# Patient Record
Sex: FEMALE | URBAN - METROPOLITAN AREA
[De-identification: names, ages, dates, MRNs, and addresses within clinical notes are randomized per-mention and may not be internally consistent; named-entity substitution may affect disease eponyms.]

---

## 2022-05-27 ENCOUNTER — OUT OF OFFICE VISIT (OUTPATIENT)
Dept: URBAN - METROPOLITAN AREA MEDICAL CENTER 12 | Facility: MEDICAL CENTER | Age: 32
End: 2022-05-27
Payer: COMMERCIAL

## 2022-05-27 DIAGNOSIS — R10.84 ABDOMINAL CRAMPING, GENERALIZED: ICD-10-CM

## 2022-05-27 DIAGNOSIS — R19.7 ACUTE DIARRHEA: ICD-10-CM

## 2022-05-27 DIAGNOSIS — R93.2 ABN FIND-BILIARY TRACT: ICD-10-CM

## 2022-05-27 PROCEDURE — 99204 OFFICE O/P NEW MOD 45 MIN: CPT | Performed by: INTERNAL MEDICINE

## 2022-05-28 ENCOUNTER — OUT OF OFFICE VISIT (OUTPATIENT)
Dept: URBAN - METROPOLITAN AREA MEDICAL CENTER 12 | Facility: MEDICAL CENTER | Age: 32
End: 2022-05-28
Payer: COMMERCIAL

## 2022-05-28 DIAGNOSIS — R19.7 ACUTE DIARRHEA: ICD-10-CM

## 2022-05-28 DIAGNOSIS — R10.84 ABDOMINAL CRAMPING, GENERALIZED: ICD-10-CM

## 2022-05-28 DIAGNOSIS — R93.2 ABN FIND-BILIARY TRACT: ICD-10-CM

## 2022-05-28 PROCEDURE — 99226 SUBSEQUENT OBSERVATION CARE, PER DAY, FOR THE EVALUATION AND MANAGEMENT OF A PATIENT, WHICH REQUIRES AT LEAST 2 OF THESE 3 KEY COMPONENTS: A DETAILE: CPT | Performed by: INTERNAL MEDICINE

## 2023-01-21 ENCOUNTER — OUT OF OFFICE VISIT (OUTPATIENT)
Dept: URBAN - METROPOLITAN AREA MEDICAL CENTER 12 | Facility: MEDICAL CENTER | Age: 33
End: 2023-01-21
Payer: COMMERCIAL

## 2023-01-21 DIAGNOSIS — R74.01 ABNORMAL/ELEVATED TRANSAMINASE (SGOT, AMINOTRANSFERASE): ICD-10-CM

## 2023-01-21 DIAGNOSIS — R74.8 ABNORMAL ALKALINE PHOSPHATASE TEST: ICD-10-CM

## 2023-01-21 DIAGNOSIS — R10.84 ABDOMINAL CRAMPING, GENERALIZED: ICD-10-CM

## 2023-01-21 DIAGNOSIS — K72.00 ACUTE AND SUBACUTE HEPATIC FAILURE WITHOUT COMA: ICD-10-CM

## 2023-01-21 PROCEDURE — G8427 DOCREV CUR MEDS BY ELIG CLIN: HCPCS | Performed by: INTERNAL MEDICINE

## 2023-01-21 PROCEDURE — 99222 1ST HOSP IP/OBS MODERATE 55: CPT | Performed by: INTERNAL MEDICINE

## 2023-01-23 ENCOUNTER — OUT OF OFFICE VISIT (OUTPATIENT)
Dept: URBAN - METROPOLITAN AREA MEDICAL CENTER 12 | Facility: MEDICAL CENTER | Age: 33
End: 2023-01-23
Payer: COMMERCIAL

## 2023-01-23 DIAGNOSIS — R74.8 ABNORMAL ALKALINE PHOSPHATASE TEST: ICD-10-CM

## 2023-01-23 DIAGNOSIS — K72.00 ACUTE AND SUBACUTE HEPATIC FAILURE WITHOUT COMA: ICD-10-CM

## 2023-01-23 DIAGNOSIS — K83.1 AMPULLA OF VATER OBSTRUCTION SYNDROME: ICD-10-CM

## 2023-01-23 DIAGNOSIS — R74.01 ABNORMAL/ELEVATED TRANSAMINASE (SGOT, AMINOTRANSFERASE): ICD-10-CM

## 2023-01-23 PROCEDURE — 99232 SBSQ HOSP IP/OBS MODERATE 35: CPT | Performed by: INTERNAL MEDICINE

## 2023-01-24 ENCOUNTER — OUT OF OFFICE VISIT (OUTPATIENT)
Dept: URBAN - METROPOLITAN AREA MEDICAL CENTER 12 | Facility: MEDICAL CENTER | Age: 33
End: 2023-01-24
Payer: COMMERCIAL

## 2023-01-24 DIAGNOSIS — R74.01 ABNORMAL/ELEVATED TRANSAMINASE (SGOT, AMINOTRANSFERASE): ICD-10-CM

## 2023-01-24 DIAGNOSIS — R79.89 ABNORMAL BILIRUBIN TEST: ICD-10-CM

## 2023-01-24 DIAGNOSIS — K72.00 ACUTE AND SUBACUTE HEPATIC FAILURE WITHOUT COMA: ICD-10-CM

## 2023-01-24 DIAGNOSIS — R74.8 ABNORMAL ALKALINE PHOSPHATASE TEST: ICD-10-CM

## 2023-01-24 PROCEDURE — 99233 SBSQ HOSP IP/OBS HIGH 50: CPT

## 2023-01-25 ENCOUNTER — OUT OF OFFICE VISIT (OUTPATIENT)
Dept: URBAN - METROPOLITAN AREA MEDICAL CENTER 12 | Facility: MEDICAL CENTER | Age: 33
End: 2023-01-25
Payer: COMMERCIAL

## 2023-01-25 DIAGNOSIS — R10.84 ABDOMINAL CRAMPING, GENERALIZED: ICD-10-CM

## 2023-01-25 DIAGNOSIS — M32.9 LUPUS: ICD-10-CM

## 2023-01-25 DIAGNOSIS — R93.2 ABN FIND-BILIARY TRACT: ICD-10-CM

## 2023-01-25 DIAGNOSIS — K72.00 ACUTE AND SUBACUTE HEPATIC FAILURE WITHOUT COMA: ICD-10-CM

## 2023-01-25 PROCEDURE — 99233 SBSQ HOSP IP/OBS HIGH 50: CPT

## 2023-02-03 ENCOUNTER — OFFICE VISIT (OUTPATIENT)
Dept: URBAN - METROPOLITAN AREA CLINIC 86 | Facility: CLINIC | Age: 33
End: 2023-02-03
Payer: COMMERCIAL

## 2023-02-03 ENCOUNTER — WEB ENCOUNTER (OUTPATIENT)
Dept: URBAN - METROPOLITAN AREA CLINIC 86 | Facility: CLINIC | Age: 33
End: 2023-02-03

## 2023-02-03 VITALS
SYSTOLIC BLOOD PRESSURE: 105 MMHG | HEIGHT: 61 IN | HEART RATE: 83 BPM | TEMPERATURE: 97 F | WEIGHT: 136 LBS | DIASTOLIC BLOOD PRESSURE: 70 MMHG | BODY MASS INDEX: 25.68 KG/M2

## 2023-02-03 DIAGNOSIS — Z98.890 HISTORY OF LIVER BIOPSY: ICD-10-CM

## 2023-02-03 DIAGNOSIS — K74.60 HEPATIC CIRRHOSIS, UNSPECIFIED HEPATIC CIRRHOSIS TYPE, UNSPECIFIED WHETHER ASCITES PRESENT: ICD-10-CM

## 2023-02-03 DIAGNOSIS — D68.61 ANTIPHOSPHOLIPID ANTIBODY SYNDROME: ICD-10-CM

## 2023-02-03 DIAGNOSIS — I85.00 ESOPHAGEAL VARICES WITHOUT BLEEDING, UNSPECIFIED ESOPHAGEAL VARICES TYPE: ICD-10-CM

## 2023-02-03 DIAGNOSIS — R74.8 ELEVATED LIVER ENZYMES: ICD-10-CM

## 2023-02-03 DIAGNOSIS — I81 PVT (PORTAL VEIN THROMBOSIS): ICD-10-CM

## 2023-02-03 DIAGNOSIS — D69.3 CHRONIC ITP (IDIOPATHIC THROMBOCYTOPENIA): ICD-10-CM

## 2023-02-03 PROCEDURE — 99204 OFFICE O/P NEW MOD 45 MIN: CPT | Performed by: PHYSICIAN ASSISTANT

## 2023-02-03 RX ORDER — CHOLECALCIFEROL (VITAMIN D3) 50 MCG
1 TABLET TABLET ORAL ONCE A DAY
Status: ACTIVE | COMMUNITY

## 2023-02-03 RX ORDER — HYDROXYCHLOROQUINE SULFATE 200 MG/1
AS DIRECTED TABLET, FILM COATED ORAL
Status: ACTIVE | COMMUNITY

## 2023-02-03 RX ORDER — FOLIC ACID 1 MG/1
1 TABLET TABLET ORAL ONCE A DAY
Status: ACTIVE | COMMUNITY

## 2023-02-06 ENCOUNTER — LAB OUTSIDE AN ENCOUNTER (OUTPATIENT)
Dept: URBAN - METROPOLITAN AREA CLINIC 86 | Facility: CLINIC | Age: 33
End: 2023-02-06

## 2023-02-07 ENCOUNTER — TELEPHONE ENCOUNTER (OUTPATIENT)
Dept: URBAN - METROPOLITAN AREA CLINIC 86 | Facility: CLINIC | Age: 33
End: 2023-02-07

## 2023-02-07 LAB
ALBUMIN/GLOBULIN RATIO: 1
ALBUMIN: 4.1
ALKALINE PHOSPHATASE: 736
ALT (SGPT): 100
AST (SGOT): 162
BILIRUBIN, DIRECT: 0.2
BILIRUBIN, INDIRECT: 0.5
BILIRUBIN, TOTAL: 0.7
GLOBULIN: 4.1
PROTEIN, TOTAL: 8.2

## 2023-02-08 ENCOUNTER — TELEPHONE ENCOUNTER (OUTPATIENT)
Dept: URBAN - METROPOLITAN AREA CLINIC 92 | Facility: CLINIC | Age: 33
End: 2023-02-08

## 2023-02-08 ENCOUNTER — TELEPHONE ENCOUNTER (OUTPATIENT)
Dept: URBAN - METROPOLITAN AREA CLINIC 86 | Facility: CLINIC | Age: 33
End: 2023-02-08

## 2023-02-09 ENCOUNTER — LAB OUTSIDE AN ENCOUNTER (OUTPATIENT)
Dept: URBAN - METROPOLITAN AREA CLINIC 86 | Facility: CLINIC | Age: 33
End: 2023-02-09

## 2023-02-09 ENCOUNTER — LAB OUTSIDE AN ENCOUNTER (OUTPATIENT)
Dept: URBAN - METROPOLITAN AREA CLINIC 92 | Facility: CLINIC | Age: 33
End: 2023-02-09

## 2023-02-09 ENCOUNTER — TELEPHONE ENCOUNTER (OUTPATIENT)
Dept: URBAN - METROPOLITAN AREA CLINIC 86 | Facility: CLINIC | Age: 33
End: 2023-02-09

## 2023-02-09 ENCOUNTER — WEB ENCOUNTER (OUTPATIENT)
Dept: URBAN - METROPOLITAN AREA CLINIC 86 | Facility: CLINIC | Age: 33
End: 2023-02-09

## 2023-02-09 LAB
ALBUMIN/GLOBULIN RATIO: 1.1
ALBUMIN: 4
ALKALINE PHOSPHATASE: 612
ALT (SGPT): 120
AST (SGOT): 154
BILIRUBIN, DIRECT: 0.2
BILIRUBIN, INDIRECT: 0.5
BILIRUBIN, TOTAL: 0.7
GLOBULIN: 3.6
PROTEIN, TOTAL: 7.6

## 2023-02-10 ENCOUNTER — WEB ENCOUNTER (OUTPATIENT)
Dept: URBAN - METROPOLITAN AREA CLINIC 86 | Facility: CLINIC | Age: 33
End: 2023-02-10

## 2023-02-10 ENCOUNTER — TELEPHONE ENCOUNTER (OUTPATIENT)
Dept: URBAN - METROPOLITAN AREA CLINIC 86 | Facility: CLINIC | Age: 33
End: 2023-02-10

## 2023-02-10 LAB
ALBUMIN/GLOBULIN RATIO: 1
ALBUMIN: 3.9
ALKALINE PHOSPHATASE: 652
ALT (SGPT): 133
AST (SGOT): 180
BILIRUBIN, DIRECT: 0.2
BILIRUBIN, INDIRECT: 0.4
BILIRUBIN, TOTAL: 0.6
GLOBULIN: 3.8
PROTEIN, TOTAL: 7.7

## 2023-02-13 ENCOUNTER — WEB ENCOUNTER (OUTPATIENT)
Dept: URBAN - METROPOLITAN AREA CLINIC 86 | Facility: CLINIC | Age: 33
End: 2023-02-13

## 2023-02-13 ENCOUNTER — LAB OUTSIDE AN ENCOUNTER (OUTPATIENT)
Dept: URBAN - METROPOLITAN AREA CLINIC 86 | Facility: CLINIC | Age: 33
End: 2023-02-13

## 2023-02-14 ENCOUNTER — OFFICE VISIT (OUTPATIENT)
Dept: URBAN - METROPOLITAN AREA CLINIC 86 | Facility: CLINIC | Age: 33
End: 2023-02-14
Payer: COMMERCIAL

## 2023-02-14 ENCOUNTER — LAB OUTSIDE AN ENCOUNTER (OUTPATIENT)
Dept: URBAN - METROPOLITAN AREA CLINIC 86 | Facility: CLINIC | Age: 33
End: 2023-02-14

## 2023-02-14 VITALS
BODY MASS INDEX: 25.86 KG/M2 | HEART RATE: 91 BPM | WEIGHT: 137 LBS | TEMPERATURE: 97.9 F | DIASTOLIC BLOOD PRESSURE: 74 MMHG | SYSTOLIC BLOOD PRESSURE: 110 MMHG | HEIGHT: 61 IN

## 2023-02-14 DIAGNOSIS — I85.00 ESOPHAGEAL VARICES WITHOUT BLEEDING, UNSPECIFIED ESOPHAGEAL VARICES TYPE: ICD-10-CM

## 2023-02-14 DIAGNOSIS — Z98.890 HISTORY OF LIVER BIOPSY: ICD-10-CM

## 2023-02-14 DIAGNOSIS — I81 PVT (PORTAL VEIN THROMBOSIS): ICD-10-CM

## 2023-02-14 DIAGNOSIS — D68.61 ANTIPHOSPHOLIPID ANTIBODY SYNDROME: ICD-10-CM

## 2023-02-14 DIAGNOSIS — D69.3 CHRONIC ITP (IDIOPATHIC THROMBOCYTOPENIA): ICD-10-CM

## 2023-02-14 DIAGNOSIS — R74.8 ELEVATED LIVER ENZYMES: ICD-10-CM

## 2023-02-14 DIAGNOSIS — K74.60 HEPATIC CIRRHOSIS, UNSPECIFIED HEPATIC CIRRHOSIS TYPE, UNSPECIFIED WHETHER ASCITES PRESENT: ICD-10-CM

## 2023-02-14 LAB
ALBUMIN/GLOBULIN RATIO: 1
ALBUMIN: 4
ALKALINE PHOSPHATASE: 578
ALT (SGPT): 117
AST (SGOT): 152
BILIRUBIN, DIRECT: 0.2
BILIRUBIN, INDIRECT: 0.5
BILIRUBIN, TOTAL: 0.7
GLOBULIN: 3.9
PROTEIN, TOTAL: 7.9

## 2023-02-14 PROCEDURE — 99214 OFFICE O/P EST MOD 30 MIN: CPT | Performed by: PHYSICIAN ASSISTANT

## 2023-02-14 RX ORDER — HYDROXYCHLOROQUINE SULFATE 200 MG/1
AS DIRECTED TABLET, FILM COATED ORAL
Status: ACTIVE | COMMUNITY

## 2023-02-14 RX ORDER — CHOLECALCIFEROL (VITAMIN D3) 50 MCG
1 TABLET TABLET ORAL ONCE A DAY
Status: ACTIVE | COMMUNITY

## 2023-02-14 RX ORDER — FOLIC ACID 1 MG/1
1 TABLET TABLET ORAL ONCE A DAY
Status: ACTIVE | COMMUNITY

## 2023-02-14 NOTE — HPI-TODAY'S VISIT:
This is a 32 year old female who was recently seen in the hospital by Dr. Shona Fuller who presetns for evaluation of elevated liver enzymes.   2/14/23 visit  2/13/23 labs with alp 578, ast 152, alt  117 2/9/23 652  .  2/7/23 alp 612, [id number], [id number] 2/6/23 alp 736, ast 162, alt 100.    Asked about diet and having more solid foods with grilled, baked foods and juicing. She is juicing a lot of fruits and vegetables with the benefiber. B12 folic, plaquenil, eliquis . She is drinkign a lot of beet juice and beets and mixings in the fruits and apples and grapes. Mixes in the carrots and leafy greens. she is not doing any protein powders. She is also doing sea strauss and she will stop this, was not taking at last visit. She started this and the juicing with beets after the last visit.   recap 2/1/23 Patient with past medical history including history of liver biopsy, chronic portal vein thrombosis, antiphospholipid syndrome, chronic idiopathic thrombocytopenia.  She is listed taking apixaban 5 mg twice daily, vitamin D, folic acid 1 mg, hydroxychloroquine alone 20 mg, pantoprazole 40 mg, presented to the hospital with right upper quadrant pain and elevated liver function test.  CT showing the chronic portal vein occlusion with cavernous formation and changes of chronic liver disease.  While in the hospital acute hepatitis panel negative and they suspected the elevation in her labs is due to the various supplements she was taking including dandelion root, tumeric, ashwaganda and this was stopped. She had a cholecystectomy while in the hospital.   1/28/23 labs cr 0.59, sodium 138, potassium 3.6, alp 359, alt 69, ast88, bilirubin 0.5. wbc 3.4, hemoglobin 9.0, mcv 84,  Aug 2022 labs alp 213, ast 53, alt 50 MRI 7/2022 liver w/changes of chronic liver disase and stable chronic thrombosis of main and right pv with cavernous transformation. thrombosis of splenic vein. Esophageal, perisplenic, and perigastric varices.  Last egd in June and banding was done  EGD done in june and 1 grade 1 varice was banded and will repeat in 1 year. Denies any issues with swelling.  Saw the surgeon yesterday, drain removed and surgical sites healing well. Still some pain and fatigue but slowly getting better. She is still off of the supplements. She had labs done yesterday and 2/3/23 alp 640, ast 151, alt 88 cr 0.74 and asked about what changed and found to be taking motrin and suspect this is the cause and she has not changed anything else. DIscussed NSAIDS and cautioned on this Discussed we will montior the labs and cautioned on other supplements.

## 2023-02-15 ENCOUNTER — LAB OUTSIDE AN ENCOUNTER (OUTPATIENT)
Dept: URBAN - METROPOLITAN AREA CLINIC 86 | Facility: CLINIC | Age: 33
End: 2023-02-15

## 2023-02-20 ENCOUNTER — LAB OUTSIDE AN ENCOUNTER (OUTPATIENT)
Dept: URBAN - METROPOLITAN AREA CLINIC 86 | Facility: CLINIC | Age: 33
End: 2023-02-20

## 2023-02-22 ENCOUNTER — WEB ENCOUNTER (OUTPATIENT)
Dept: URBAN - METROPOLITAN AREA CLINIC 86 | Facility: CLINIC | Age: 33
End: 2023-02-22

## 2023-02-23 ENCOUNTER — TELEPHONE ENCOUNTER (OUTPATIENT)
Dept: URBAN - METROPOLITAN AREA CLINIC 86 | Facility: CLINIC | Age: 33
End: 2023-02-23

## 2023-02-23 ENCOUNTER — WEB ENCOUNTER (OUTPATIENT)
Dept: URBAN - METROPOLITAN AREA CLINIC 86 | Facility: CLINIC | Age: 33
End: 2023-02-23

## 2023-02-23 PROBLEM — 200936003 LUPUS: Status: ACTIVE | Noted: 2023-02-23

## 2023-02-23 LAB
ALBUMIN/GLOBULIN RATIO: 0.9
ALBUMIN: 4
ALKALINE PHOSPHATASE: 392
ALT (SGPT): 212
AST (SGOT): 178
BILIRUBIN, DIRECT: 0.2
BILIRUBIN, INDIRECT: 0.8
BILIRUBIN, TOTAL: 1
GLOBULIN: 4.3
IMMUNOGLOBULIN A: 347
IMMUNOGLOBULIN G: 2438
IMMUNOGLOBULIN M: 81
PROTEIN, TOTAL: 8.3

## 2023-02-27 ENCOUNTER — LAB OUTSIDE AN ENCOUNTER (OUTPATIENT)
Dept: URBAN - METROPOLITAN AREA CLINIC 86 | Facility: CLINIC | Age: 33
End: 2023-02-27

## 2023-02-27 ENCOUNTER — WEB ENCOUNTER (OUTPATIENT)
Dept: URBAN - METROPOLITAN AREA CLINIC 86 | Facility: CLINIC | Age: 33
End: 2023-02-27

## 2023-02-27 ENCOUNTER — TELEPHONE ENCOUNTER (OUTPATIENT)
Dept: URBAN - METROPOLITAN AREA CLINIC 86 | Facility: CLINIC | Age: 33
End: 2023-02-27

## 2023-02-27 PROBLEM — 301717006 RIGHT UPPER QUADRANT PAIN: Status: ACTIVE | Noted: 2023-02-27

## 2023-03-01 ENCOUNTER — OFFICE VISIT (OUTPATIENT)
Dept: URBAN - METROPOLITAN AREA CLINIC 86 | Facility: CLINIC | Age: 33
End: 2023-03-01

## 2023-03-01 ENCOUNTER — OFFICE VISIT (OUTPATIENT)
Dept: URBAN - METROPOLITAN AREA TELEHEALTH 2 | Facility: TELEHEALTH | Age: 33
End: 2023-03-01
Payer: COMMERCIAL

## 2023-03-01 VITALS — HEIGHT: 61 IN | BODY MASS INDEX: 24.55 KG/M2 | WEIGHT: 130 LBS

## 2023-03-01 DIAGNOSIS — K71.9 DRUG-INDUCED LIVER INJURY: ICD-10-CM

## 2023-03-01 DIAGNOSIS — K74.69 CIRRHOSIS, CRYPTOGENIC: ICD-10-CM

## 2023-03-01 DIAGNOSIS — I85.10 ESOPH VARICE OTHER DIS: ICD-10-CM

## 2023-03-01 DIAGNOSIS — I81 PVT (PORTAL VEIN THROMBOSIS): ICD-10-CM

## 2023-03-01 PROBLEM — 453861000124107: Status: ACTIVE | Noted: 2023-03-01

## 2023-03-01 PROBLEM — 416940007: Status: ACTIVE | Noted: 2023-03-01

## 2023-03-01 PROBLEM — 26843008: Status: ACTIVE | Noted: 2023-02-01

## 2023-03-01 PROBLEM — 13172003: Status: ACTIVE | Noted: 2023-02-01

## 2023-03-01 PROBLEM — 19943007: Status: ACTIVE | Noted: 2023-02-01

## 2023-03-01 PROBLEM — 17920008 PORTAL VEIN THROMBOSIS: Status: ACTIVE | Noted: 2023-02-07

## 2023-03-01 PROBLEM — 427399008: Status: ACTIVE | Noted: 2023-03-01

## 2023-03-01 PROBLEM — 707724006 ELEVATED LIVER ENZYMES LEVEL: Status: ACTIVE | Noted: 2023-02-07

## 2023-03-01 PROBLEM — 14223005: Status: ACTIVE | Noted: 2023-02-01

## 2023-03-01 PROCEDURE — 99214 OFFICE O/P EST MOD 30 MIN: CPT

## 2023-03-01 RX ORDER — HYDROXYCHLOROQUINE SULFATE 200 MG/1
AS DIRECTED TABLET, FILM COATED ORAL
Status: ACTIVE | COMMUNITY

## 2023-03-01 RX ORDER — CHOLECALCIFEROL (VITAMIN D3) 50 MCG
1 TABLET TABLET ORAL ONCE A DAY
Status: ACTIVE | COMMUNITY

## 2023-03-01 RX ORDER — FOLIC ACID 1 MG/1
1 TABLET TABLET ORAL ONCE A DAY
Status: ACTIVE | COMMUNITY

## 2023-03-01 NOTE — HPI-TODAY'S VISIT:
This is a 32 year old female who was recently seen in the hospital by Dr. Shona Fuller who presetns for evaluation of elevated liver enzymes.    3/1/23 jacob glez alk phos 392, ast 178, alt 212 2/14/23 visit  2/13/23 labs with alp 578, ast 152, alt  117 2/9/23 652  .  2/7/23 alp 612, [id number], [id number] 2/6/23 alp 736, ast 162, alt 100.    Asked about diet and having more solid foods with grilled, baked foods and juicing. She is juicing a lot of fruits and vegetables with the benefiber. B12 folic, plaquenil, eliquis . She is drinkign a lot of beet juice and beets and mixings in the fruits and apples and grapes. Mixes in the carrots and leafy greens. she is not doing any protein powders. She is also doing sea strauss and she will stop this, was not taking at last visit. She started this and the juicing with beets after the last visit.   recap 2/1/23 Patient with past medical history including history of liver biopsy, chronic portal vein thrombosis, antiphospholipid syndrome, chronic idiopathic thrombocytopenia.  She is listed taking apixaban 5 mg twice daily, vitamin D, folic acid 1 mg, hydroxychloroquine alone 20 mg, pantoprazole 40 mg, presented to the hospital with right upper quadrant pain and elevated liver function test.  CT showing the chronic portal vein occlusion with cavernous formation and changes of chronic liver disease.  While in the hospital acute hepatitis panel negative and they suspected the elevation in her labs is due to the various supplements she was taking including dandelion root, tumeric, ashwaganda and this was stopped. She had a cholecystectomy while in the hospital.   1/28/23 labs cr 0.59, sodium 138, potassium 3.6, alp 359, alt 69, ast88, bilirubin 0.5. wbc 3.4, hemoglobin 9.0, mcv 84,  Aug 2022 labs alp 213, ast 53, alt 50 MRI 7/2022 liver w/changes of chronic liver disase and stable chronic thrombosis of main and right pv with cavernous transformation. thrombosis of splenic vein. Esophageal, perisplenic, and perigastric varices.  Last egd in June and banding was done  EGD done in june and 1 grade 1 varice was banded and will repeat in 1 year. Denies any issues with swelling.  Saw the surgeon yesterday, drain removed and surgical sites healing well. Still some pain and fatigue but slowly getting better. She is still off of the supplements. She had labs done yesterday and 2/3/23 alp 640, ast 151, alt 88 cr 0.74 and asked about what changed and found to be taking motrin and suspect this is the cause and she has not changed anything else. DIscussed NSAIDS and cautioned on this Discussed we will montior the labs and cautioned on other supplements.

## 2023-03-01 NOTE — HPI-TODAY'S VISIT:
This is a 32 year old female who was recently seen in the hospital by Dr. Shona Fuller who presetns for evaluation of elevated liver enzymes.   3/1/23 2/23/23 alk phos 392, ast 178, alt 212 igg 2438 bili 1.0 recap 2/13/23 labs with alp 578, ast 152, alt  117 2/9/23 652  .  2/7/23 alp 612, [id number], [id number] 2/6/23 alp 736, ast 162, alt 100.    Asked about diet and having more solid foods with grilled, baked foods and juicing. She is juicing a lot of fruits and vegetables with the benefiber. B12 folic, plaquenil, eliquis . She is drinkign a lot of beet juice and beets and mixings in the fruits and apples and grapes. Mixes in the carrots and leafy greens. she is not doing any protein powders. She is also doing sea strauss and she will stop this, was not taking at last visit. She started this and the juicing with beets after the last visit.   recap 2/1/23 Patient with past medical history including history of liver biopsy, chronic portal vein thrombosis, antiphospholipid syndrome, chronic idiopathic thrombocytopenia.  She is listed taking apixaban 5 mg twice daily, vitamin D, folic acid 1 mg, hydroxychloroquine alone 20 mg, pantoprazole 40 mg, presented to the hospital with right upper quadrant pain and elevated liver function test.  CT showing the chronic portal vein occlusion with cavernous formation and changes of chronic liver disease.  While in the hospital acute hepatitis panel negative and they suspected the elevation in her labs is due to the various supplements she was taking including dandelion root, tumeric, ashwaganda and this was stopped. She had a cholecystectomy while in the hospital.   1/28/23 labs cr 0.59, sodium 138, potassium 3.6, alp 359, alt 69, ast88, bilirubin 0.5. wbc 3.4, hemoglobin 9.0, mcv 84,  Aug 2022 labs alp 213, ast 53, alt 50 MRI 7/2022 liver w/changes of chronic liver disase and stable chronic thrombosis of main and right pv with cavernous transformation. thrombosis of splenic vein. Esophageal, perisplenic, and perigastric varices.  Last egd in June and banding was done  EGD done in june and 1 grade 1 varice was banded and will repeat in 1 year. Denies any issues with swelling.  Saw the surgeon yesterday, drain removed and surgical sites healing well. Still some pain and fatigue but slowly getting better. She is still off of the supplements. She had labs done yesterday and 2/3/23 alp 640, ast 151, alt 88 cr 0.74 and asked about what changed and found to be taking motrin and suspect this is the cause and she has not changed anything else. DIscussed NSAIDS and cautioned on this Discussed we will montior the labs and cautioned on other supplements.

## 2023-03-02 ENCOUNTER — LAB OUTSIDE AN ENCOUNTER (OUTPATIENT)
Dept: URBAN - METROPOLITAN AREA CLINIC 86 | Facility: CLINIC | Age: 33
End: 2023-03-02

## 2023-03-04 LAB
ALBUMIN/GLOBULIN RATIO: 0.9
ALBUMIN: 3.6
ALKALINE PHOSPHATASE: 268
ALT (SGPT): 39
AST (SGOT): 41
BILIRUBIN, DIRECT: 0.1
BILIRUBIN, INDIRECT: 0.3
BILIRUBIN, TOTAL: 0.4
GLOBULIN: 3.9
PROTEIN, TOTAL: 7.5

## 2023-03-06 ENCOUNTER — TELEPHONE ENCOUNTER (OUTPATIENT)
Dept: URBAN - METROPOLITAN AREA CLINIC 86 | Facility: CLINIC | Age: 33
End: 2023-03-06

## 2023-03-06 ENCOUNTER — WEB ENCOUNTER (OUTPATIENT)
Dept: URBAN - METROPOLITAN AREA CLINIC 86 | Facility: CLINIC | Age: 33
End: 2023-03-06

## 2023-03-06 LAB
IMMUNOGLOBULIN A: 356
IMMUNOGLOBULIN G: 2430
IMMUNOGLOBULIN M: 81

## 2023-03-08 ENCOUNTER — OFFICE VISIT (OUTPATIENT)
Dept: URBAN - METROPOLITAN AREA CLINIC 92 | Facility: CLINIC | Age: 33
End: 2023-03-08
Payer: COMMERCIAL

## 2023-03-08 ENCOUNTER — TELEPHONE ENCOUNTER (OUTPATIENT)
Dept: URBAN - METROPOLITAN AREA CLINIC 92 | Facility: CLINIC | Age: 33
End: 2023-03-08

## 2023-03-08 VITALS
TEMPERATURE: 97 F | HEART RATE: 91 BPM | HEIGHT: 61 IN | DIASTOLIC BLOOD PRESSURE: 72 MMHG | WEIGHT: 138 LBS | SYSTOLIC BLOOD PRESSURE: 104 MMHG | BODY MASS INDEX: 26.06 KG/M2

## 2023-03-08 DIAGNOSIS — I85.00 VARICES, ESOPHAGEAL: ICD-10-CM

## 2023-03-08 DIAGNOSIS — K59.09 CHRONIC CONSTIPATION: ICD-10-CM

## 2023-03-08 PROBLEM — 14223005 ESOPHAGEAL VARICES WITHOUT BLEEDING: Status: ACTIVE | Noted: 2023-03-08

## 2023-03-08 PROCEDURE — 99215 OFFICE O/P EST HI 40 MIN: CPT | Performed by: INTERNAL MEDICINE

## 2023-03-08 RX ORDER — HYDROXYCHLOROQUINE SULFATE 200 MG/1
AS DIRECTED TABLET, FILM COATED ORAL
Status: ACTIVE | COMMUNITY

## 2023-03-08 RX ORDER — CHOLECALCIFEROL (VITAMIN D3) 50 MCG
1 TABLET TABLET ORAL ONCE A DAY
Status: ACTIVE | COMMUNITY

## 2023-03-08 RX ORDER — FOLIC ACID 1 MG/1
1 TABLET TABLET ORAL ONCE A DAY
Status: ACTIVE | COMMUNITY

## 2023-03-08 NOTE — HPI-TODAY'S VISIT:
32yF with a hx of PVT c/b cirrhosis, on Eliquis, SLE, ITP, recurrent PEs, recent CCY one month ago, who presents to discuss abdominal discomfort/bloating. More constipation than diarrhea, can go up to 5 days without a BM, worsening in the pats 2 years. Constipation followed by diarrhea. Since CCY has been having a daily BM, but feels incomplete. Sometimes pebble-like, sometimes loose. Has bloating/gas, abdominal discomfort. No blood in the stool.  Has had esophageal varices s/p banding at [place], and she follows over there to get EGDs annually.

## 2023-03-09 LAB
ABSOLUTE BASOPHILS: 0
ABSOLUTE EOSINOPHILS: 0
ABSOLUTE LYMPHOCYTES: 1288
ABSOLUTE MONOCYTES: 368
ABSOLUTE NEUTROPHILS: 2944
BASOPHILS: 0
COMMENT(S): (no result)
EOSINOPHILS: 0
HEMATOCRIT: 27.5
HEMOGLOBIN: 8.8
LYMPHOCYTES: 28
MCH: 25.1
MCHC: 32
MCV: 78.3
MONOCYTES: 8
MPV: 11.6
NEUTROPHILS: 64
NOTE: (no result)
PLATELET COUNT: 114
RDW: 14.8
RED BLOOD CELL COUNT: 3.51
TSH: 2.93
WHITE BLOOD CELL COUNT: 4.6

## 2023-03-15 ENCOUNTER — LAB OUTSIDE AN ENCOUNTER (OUTPATIENT)
Dept: URBAN - METROPOLITAN AREA TELEHEALTH 2 | Facility: TELEHEALTH | Age: 33
End: 2023-03-15

## 2023-03-29 ENCOUNTER — LAB OUTSIDE AN ENCOUNTER (OUTPATIENT)
Dept: URBAN - METROPOLITAN AREA TELEHEALTH 2 | Facility: TELEHEALTH | Age: 33
End: 2023-03-29

## 2023-04-10 ENCOUNTER — LAB OUTSIDE AN ENCOUNTER (OUTPATIENT)
Dept: URBAN - METROPOLITAN AREA TELEHEALTH 2 | Facility: TELEHEALTH | Age: 33
End: 2023-04-10

## 2023-04-12 ENCOUNTER — OFFICE VISIT (OUTPATIENT)
Dept: URBAN - METROPOLITAN AREA TELEHEALTH 2 | Facility: TELEHEALTH | Age: 33
End: 2023-04-12

## 2023-04-14 ENCOUNTER — WEB ENCOUNTER (OUTPATIENT)
Dept: URBAN - METROPOLITAN AREA CLINIC 86 | Facility: CLINIC | Age: 33
End: 2023-04-14

## 2023-04-14 ENCOUNTER — TELEPHONE ENCOUNTER (OUTPATIENT)
Dept: URBAN - METROPOLITAN AREA CLINIC 86 | Facility: CLINIC | Age: 33
End: 2023-04-14

## 2023-04-14 ENCOUNTER — OFFICE VISIT (OUTPATIENT)
Dept: URBAN - METROPOLITAN AREA CLINIC 86 | Facility: CLINIC | Age: 33
End: 2023-04-14

## 2023-04-14 RX ORDER — HYDROXYCHLOROQUINE SULFATE 200 MG/1
AS DIRECTED TABLET, FILM COATED ORAL
Status: ACTIVE | COMMUNITY

## 2023-04-14 RX ORDER — CHOLECALCIFEROL (VITAMIN D3) 50 MCG
1 TABLET TABLET ORAL ONCE A DAY
Status: ACTIVE | COMMUNITY

## 2023-04-14 RX ORDER — FOLIC ACID 1 MG/1
1 TABLET TABLET ORAL ONCE A DAY
Status: ACTIVE | COMMUNITY

## 2023-04-14 NOTE — HPI-TODAY'S VISIT:
This is a 32 year old female who was recently seen in the hospital by Dr. Shona Fuller who presetns for evaluation of elevated liver enzymes.   3/1/23 2/23/23 alk phos 392, ast 178, alt 212 igg 2438 bili 1.0 recap 2/13/23 labs with alp 578, ast 152, alt  117 2/9/23 652  .  2/7/23 alp 612, [id number], [id number] 2/6/23 alp 736, ast 162, alt 100.    Asked about diet and having more solid foods with grilled, baked foods and juicing. She is juicing a lot of fruits and vegetables with the benefiber. B12 folic, plaquenil, eliquis . She is drinkign a lot of beet juice and beets and mixings in the fruits and apples and grapes. Mixes in the carrots and leafy greens. she is not doing any protein powders. She is also doing sea strauss and she will stop this, was not taking at last visit. She started this and the juicing with beets after the last visit.   recap 2/1/23 Patient with past medical history including history of liver biopsy, chronic portal vein thrombosis, antiphospholipid syndrome, chronic idiopathic thrombocytopenia.  She is listed taking apixaban 5 mg twice daily, vitamin D, folic acid 1 mg, hydroxychloroquine alone 20 mg, pantoprazole 40 mg, presented to the hospital with right upper quadrant pain and elevated liver function test.  CT showing the chronic portal vein occlusion with cavernous formation and changes of chronic liver disease.  While in the hospital acute hepatitis panel negative and they suspected the elevation in her labs is due to the various supplements she was taking including dandelion root, tumeric, ashwaganda and this was stopped. She had a cholecystectomy while in the hospital.   1/28/23 labs cr 0.59, sodium 138, potassium 3.6, alp 359, alt 69, ast88, bilirubin 0.5. wbc 3.4, hemoglobin 9.0, mcv 84,  Aug 2022 labs alp 213, ast 53, alt 50 MRI 7/2022 liver w/changes of chronic liver disase and stable chronic thrombosis of main and right pv with cavernous transformation. thrombosis of splenic vein. Esophageal, perisplenic, and perigastric varices.  Last egd in June and banding was done  EGD done in june and 1 grade 1 varice was banded and will repeat in 1 year. Denies any issues with swelling.  Saw the surgeon yesterday, drain removed and surgical sites healing well. Still some pain and fatigue but slowly getting better. She is still off of the supplements. She had labs done yesterday and 2/3/23 alp 640, ast 151, alt 88 cr 0.74 and asked about what changed and found to be taking motrin and suspect this is the cause and she has not changed anything else. DIscussed NSAIDS and cautioned on this Discussed we will montior the labs and cautioned on other supplements.

## 2023-04-21 ENCOUNTER — OFFICE VISIT (OUTPATIENT)
Dept: URBAN - METROPOLITAN AREA CLINIC 86 | Facility: CLINIC | Age: 33
End: 2023-04-21
Payer: COMMERCIAL

## 2023-04-21 VITALS
WEIGHT: 137 LBS | BODY MASS INDEX: 25.86 KG/M2 | HEIGHT: 61 IN | HEART RATE: 98 BPM | DIASTOLIC BLOOD PRESSURE: 72 MMHG | TEMPERATURE: 97.4 F | SYSTOLIC BLOOD PRESSURE: 110 MMHG

## 2023-04-21 DIAGNOSIS — I81 PVT (PORTAL VEIN THROMBOSIS): ICD-10-CM

## 2023-04-21 DIAGNOSIS — D69.3 CHRONIC ITP (IDIOPATHIC THROMBOCYTOPENIA): ICD-10-CM

## 2023-04-21 DIAGNOSIS — K71.9 DRUG-INDUCED LIVER INJURY: ICD-10-CM

## 2023-04-21 DIAGNOSIS — R74.8 ELEVATED LIVER ENZYMES: ICD-10-CM

## 2023-04-21 DIAGNOSIS — Z98.890 HISTORY OF LIVER BIOPSY: ICD-10-CM

## 2023-04-21 DIAGNOSIS — I85.00 ESOPHAGEAL VARICES WITHOUT BLEEDING, UNSPECIFIED ESOPHAGEAL VARICES TYPE: ICD-10-CM

## 2023-04-21 DIAGNOSIS — Z79.899 HIGH RISK MEDICATION USE: ICD-10-CM

## 2023-04-21 DIAGNOSIS — D68.61 ANTIPHOSPHOLIPID ANTIBODY SYNDROME: ICD-10-CM

## 2023-04-21 DIAGNOSIS — K74.60 HEPATIC CIRRHOSIS, UNSPECIFIED HEPATIC CIRRHOSIS TYPE, UNSPECIFIED WHETHER ASCITES PRESENT: ICD-10-CM

## 2023-04-21 PROCEDURE — 99214 OFFICE O/P EST MOD 30 MIN: CPT | Performed by: PHYSICIAN ASSISTANT

## 2023-04-21 RX ORDER — FOLIC ACID 1 MG/1
1 TABLET TABLET ORAL ONCE A DAY
Status: ACTIVE | COMMUNITY

## 2023-04-21 RX ORDER — CHOLECALCIFEROL (VITAMIN D3) 50 MCG
1 TABLET TABLET ORAL ONCE A DAY
Status: ACTIVE | COMMUNITY

## 2023-04-21 RX ORDER — HYDROXYCHLOROQUINE SULFATE 200 MG/1
AS DIRECTED TABLET, FILM COATED ORAL
Status: ACTIVE | COMMUNITY

## 2023-04-21 NOTE — HPI-TODAY'S VISIT:
This is a 32 year old female who was recently seen in the hospital by Dr. Shona Fuller who presetns for evaluation of elevated liver enzymes.   4/21/23 Pt no showed appt last week. several attempts were made to contact and no answer.  She saw the hematologist. her platlets are low and going on steriods.  They are discussing rixutan  4/20/23 labs with alp 107, ast 37, 20. cr 0.63,  cbc with white blood cells 2.7, 3.54, hemoglobin 8.8, 39, mcv 81 happy to see the enzymes are better but concerned with platelets and they will be monitoring this check the immunoglobulins next week she is working on reducing the sugar in diet and red meat She will need updated imaging on the liver and clot  she is due for egd in june with dr. mckay   3/1/23 2/23/23 alk phos 392, ast 178, alt 212 igg 2438 bili 1.0 recap 2/13/23 labs with alp 578, ast 152, alt  117 2/9/23 652  .  2/7/23 alp 612, [id number], [id number] 2/6/23 alp 736, ast 162, alt 100.    Asked about diet and having more solid foods with grilled, baked foods and juicing. She is juicing a lot of fruits and vegetables with the benefiber. B12 folic, plaquenil, eliquis . She is drinkign a lot of beet juice and beets and mixings in the fruits and apples and grapes. Mixes in the carrots and leafy greens. she is not doing any protein powders. She is also doing sea strauss and she will stop this, was not taking at last visit. She started this and the juicing with beets after the last visit.   recap 2/1/23 Patient with past medical history including history of liver biopsy, chronic portal vein thrombosis, antiphospholipid syndrome, chronic idiopathic thrombocytopenia.  She is listed taking apixaban 5 mg twice daily, vitamin D, folic acid 1 mg, hydroxychloroquine alone 20 mg, pantoprazole 40 mg, presented to the hospital with right upper quadrant pain and elevated liver function test.  CT showing the chronic portal vein occlusion with cavernous formation and changes of chronic liver disease.  While in the hospital acute hepatitis panel negative and they suspected the elevation in her labs is due to the various supplements she was taking including dandelion root, tumeric, ashwaganda and this was stopped. She had a cholecystectomy while in the hospital.   1/28/23 labs cr 0.59, sodium 138, potassium 3.6, alp 359, alt 69, ast88, bilirubin 0.5. wbc 3.4, hemoglobin 9.0, mcv 84,  Aug 2022 labs alp 213, ast 53, alt 50 MRI 7/2022 liver w/changes of chronic liver disase and stable chronic thrombosis of main and right pv with cavernous transformation. thrombosis of splenic vein. Esophageal, perisplenic, and perigastric varices.  Last egd in June and banding was done  EGD done in june and 1 grade 1 varice was banded and will repeat in 1 year. Denies any issues with swelling.  Saw the surgeon yesterday, drain removed and surgical sites healing well. Still some pain and fatigue but slowly getting better. She is still off of the supplements. She had labs done yesterday and 2/3/23 alp 640, ast 151, alt 88 cr 0.74 and asked about what changed and found to be taking motrin and suspect this is the cause and she has not changed anything else. DIscussed NSAIDS and cautioned on this Discussed we will montior the labs and cautioned on other supplements.

## 2023-04-27 ENCOUNTER — LAB OUTSIDE AN ENCOUNTER (OUTPATIENT)
Dept: URBAN - METROPOLITAN AREA CLINIC 86 | Facility: CLINIC | Age: 33
End: 2023-04-27

## 2023-05-03 ENCOUNTER — WEB ENCOUNTER (OUTPATIENT)
Dept: URBAN - METROPOLITAN AREA CLINIC 86 | Facility: CLINIC | Age: 33
End: 2023-05-03

## 2023-05-03 ENCOUNTER — TELEPHONE ENCOUNTER (OUTPATIENT)
Dept: URBAN - METROPOLITAN AREA CLINIC 86 | Facility: CLINIC | Age: 33
End: 2023-05-03

## 2023-05-03 LAB
A/G RATIO: 0.8
ABSOLUTE BASOPHILS: 0
ABSOLUTE EOSINOPHILS: 0
ABSOLUTE LYMPHOCYTES: 972
ABSOLUTE MONOCYTES: 268
ABSOLUTE NEUTROPHILS: 5461
ALBUMIN: 3.4
ALKALINE PHOSPHATASE: 102
ALT (SGPT): 30
AST (SGOT): 36
BASOPHILS: 0
BILIRUBIN, TOTAL: 0.6
BUN/CREATININE RATIO: (no result)
BUN: 14
CALCIUM: 8.5
CARBON DIOXIDE, TOTAL: 23
CHLORIDE: 108
CREATININE: 0.62
EGFR: 123
EOSINOPHILS: 0
GLOBULIN, TOTAL: 4.3
GLUCOSE: 105
HEMATOCRIT: 29.2
HEMOGLOBIN: 9
INR: 1.1
LYMPHOCYTES: 14.5
MCH: 24.3
MCHC: 30.8
MCV: 78.9
MONOCYTES: 4
MPV: 11.9
NEUTROPHILS: 81.5
PLATELET COUNT: 131
POTASSIUM: 3.9
PROTEIN, TOTAL: 7.7
PT: 11.2
RDW: 15.6
RED BLOOD CELL COUNT: 3.7
SODIUM: 137
WHITE BLOOD CELL COUNT: 6.7

## 2023-05-03 NOTE — HPI-TODAY'S VISIT:
Dear Karen Ngo,  The 5/2/23 labs were sent to me. The red blood cells low at 3.7, hemoglobin low at 9, mcv low at 78, platelets low at 131.Glucose 105, creatinine 0.62, sodium 137, potassium 3.9, bilirubin 0.6, albumin low at 3.4, alkaline phosphatase 102, ast 36, alt 30. INR 1.1. It appears the liver enzymes are slightly higher and sometimes the steriods can increase the blood sugar and lead to this. Any new meds? have they started the rituxan?  Be sure to get scheduled for the EGD and let us know if there are issues.  Flory Kumar PA-C

## 2023-05-05 ENCOUNTER — WEB ENCOUNTER (OUTPATIENT)
Dept: URBAN - METROPOLITAN AREA CLINIC 86 | Facility: CLINIC | Age: 33
End: 2023-05-05

## 2023-05-10 ENCOUNTER — WEB ENCOUNTER (OUTPATIENT)
Dept: URBAN - METROPOLITAN AREA CLINIC 86 | Facility: CLINIC | Age: 33
End: 2023-05-10

## 2023-05-19 ENCOUNTER — LAB OUTSIDE AN ENCOUNTER (OUTPATIENT)
Dept: URBAN - METROPOLITAN AREA CLINIC 86 | Facility: CLINIC | Age: 33
End: 2023-05-19

## 2023-05-23 ENCOUNTER — OFFICE VISIT (OUTPATIENT)
Dept: URBAN - METROPOLITAN AREA CLINIC 17 | Facility: CLINIC | Age: 33
End: 2023-05-23

## 2023-05-25 ENCOUNTER — LAB OUTSIDE AN ENCOUNTER (OUTPATIENT)
Dept: URBAN - METROPOLITAN AREA CLINIC 92 | Facility: CLINIC | Age: 33
End: 2023-05-25

## 2023-05-26 ENCOUNTER — OFFICE VISIT (OUTPATIENT)
Dept: URBAN - METROPOLITAN AREA CLINIC 86 | Facility: CLINIC | Age: 33
End: 2023-05-26
Payer: COMMERCIAL

## 2023-05-26 DIAGNOSIS — Z79.899 HIGH RISK MEDICATION USE: ICD-10-CM

## 2023-05-26 DIAGNOSIS — R74.8 ELEVATED LIVER ENZYMES: ICD-10-CM

## 2023-05-26 DIAGNOSIS — Z98.890 HISTORY OF LIVER BIOPSY: ICD-10-CM

## 2023-05-26 DIAGNOSIS — I81 PVT (PORTAL VEIN THROMBOSIS): ICD-10-CM

## 2023-05-26 DIAGNOSIS — D69.3 CHRONIC ITP (IDIOPATHIC THROMBOCYTOPENIA): ICD-10-CM

## 2023-05-26 DIAGNOSIS — K71.9 DRUG-INDUCED LIVER INJURY: ICD-10-CM

## 2023-05-26 DIAGNOSIS — K74.60 HEPATIC CIRRHOSIS, UNSPECIFIED HEPATIC CIRRHOSIS TYPE, UNSPECIFIED WHETHER ASCITES PRESENT: ICD-10-CM

## 2023-05-26 DIAGNOSIS — I85.00 ESOPHAGEAL VARICES WITHOUT BLEEDING, UNSPECIFIED ESOPHAGEAL VARICES TYPE: ICD-10-CM

## 2023-05-26 DIAGNOSIS — D68.61 ANTIPHOSPHOLIPID ANTIBODY SYNDROME: ICD-10-CM

## 2023-05-26 PROCEDURE — 99214 OFFICE O/P EST MOD 30 MIN: CPT | Performed by: PHYSICIAN ASSISTANT

## 2023-05-26 RX ORDER — HYDROXYCHLOROQUINE SULFATE 200 MG/1
AS DIRECTED TABLET, FILM COATED ORAL
Status: ACTIVE | COMMUNITY

## 2023-05-26 RX ORDER — CHOLECALCIFEROL (VITAMIN D3) 50 MCG
1 TABLET TABLET ORAL ONCE A DAY
Status: ACTIVE | COMMUNITY

## 2023-05-26 RX ORDER — FOLIC ACID 1 MG/1
1 TABLET TABLET ORAL ONCE A DAY
Status: ACTIVE | COMMUNITY

## 2023-05-26 NOTE — HPI-TODAY'S VISIT:
This is a 32 year old female who was recently seen in the hospital by Dr. Shona Fuller who presetns for evaluation of elevated liver enzymes.   5/26  The 5/2/23 labs The red blood cells low at 3.7, hemoglobin low at 9, mcv low at 78, platelets low at 131.Glucose 105, creatinine 0.62, sodium 137, potassium 3.9, bilirubin 0.6, albumin low at 3.4, alkaline phosphatase 102, ast 36, alt 30. INR 1.1. It appears the liver enzymes are slightly higher and sometimes the steriods can increase  no new meds, was on steriods in late april so sugars higher she may have had ibprofen around that time She will do labs today   4/21/23 Pt no showed appt last week. several attempts were made to contact and no answer.  She saw the hematologist. her platlets are low and going on steriods.  They are discussing rixutan  4/20/23 labs with alp 107, ast 37, 20. cr 0.63,  cbc with white blood cells 2.7, 3.54, hemoglobin 8.8, 39, mcv 81 happy to see the enzymes are better but concerned with platelets and they will be monitoring this check the immunoglobulins next week she is working on reducing the sugar in diet and red meat She will need updated imaging on the liver and clot  she is due for egd in june with dr. mckay   3/1/23 2/23/23 alk phos 392, ast 178, alt 212 igg 2438 bili 1.0 recap 2/13/23 labs with alp 578, ast 152, alt  117 2/9/23 652  .  2/7/23 alp 612, [id number], [id number] 2/6/23 alp 736, ast 162, alt 100.    Asked about diet and having more solid foods with grilled, baked foods and juicing. She is juicing a lot of fruits and vegetables with the benefiber. B12 folic, plaquenil, eliquis . She is drinkign a lot of beet juice and beets and mixings in the fruits and apples and grapes. Mixes in the carrots and leafy greens. she is not doing any protein powders. She is also doing sea strauss and she will stop this, was not taking at last visit. She started this and the juicing with [email or web address] after the last visit.   recap 2/1/23 Patient with past medical history including history of liver biopsy, chronic portal vein thrombosis, antiphospholipid syndrome, chronic idiopathic thrombocytopenia.  She is listed taking apixaban 5 mg twice daily, vitamin D, folic acid 1 mg, hydroxychloroquine alone 20 mg, pantoprazole 40 mg, presented to the hospital with right upper quadrant pain and elevated liver function test.  CT showing the chronic portal vein occlusion with cavernous formation and changes of chronic liver disease.  While in the hospital acute hepatitis panel negative and they suspected the elevation in her labs is due to the various supplements she was taking including dandelion root, tumeric, ashwaganda and this was stopped. She had a cholecystectomy while in the hospital.   1/28/23 labs cr 0.59, sodium 138, potassium 3.6, alp 359, alt 69, ast88, bilirubin 0.5. wbc 3.4, hemoglobin 9.0, mcv 84,  Aug 2022 labs alp 213, ast 53, alt 50 MRI 7/2022 liver w/changes of chronic liver disase and stable chronic thrombosis of main and right pv with cavernous transformation. thrombosis of splenic vein. Esophageal, perisplenic, and perigastric varices.  Last egd in June and banding was done  EGD done in june and 1 grade 1 varice was banded and will repeat in 1 year. Denies any issues with swelling.  Saw the surgeon yesterday, drain removed and surgical sites healing well. Still some pain and fatigue but slowly getting better. She is still off of the supplements. She had labs done yesterday and 2/3/23 alp 640, ast 151, alt 88 cr 0.74 and asked about what changed and found to be taking motrin and suspect this is the cause and she has not changed anything else. DIscussed NSAIDS and cautioned on this Discussed we will montior the labs and cautioned on other supplements.

## 2023-05-30 LAB
A/G RATIO: 0.9
ABSOLUTE BASOPHILS: 9
ABSOLUTE EOSINOPHILS: 39
ABSOLUTE LYMPHOCYTES: 1275
ABSOLUTE MONOCYTES: 309
ABSOLUTE NEUTROPHILS: 1368
ALBUMIN: 3.7
ALKALINE PHOSPHATASE: 114
ALT (SGPT): 23
AST (SGOT): 36
BASOPHILS: 0.3
BILIRUBIN, TOTAL: 0.7
BUN/CREATININE RATIO: (no result)
BUN: 7
CALCIUM: 8.8
CARBON DIOXIDE, TOTAL: 18
CHLORIDE: 106
CREATININE: 0.56
EGFR: 125
EOSINOPHILS: 1.3
GLOBULIN, TOTAL: 4.2
GLUCOSE: 40
HEMATOCRIT: 28.7
HEMOGLOBIN: 8.7
IMMUNOGLOBULIN A: 376
IMMUNOGLOBULIN G: 2654
IMMUNOGLOBULIN M: 93
INR: 1.1
LYMPHOCYTES: 42.5
MCH: 24.3
MCHC: 30.3
MCV: 80.2
MONOCYTES: 10.3
MPV: 11
NEUTROPHILS: 45.6
PLATELET COUNT: 145
POTASSIUM: 4.7
PROTEIN, TOTAL: 7.9
PT: 11.9
RDW: 16
RED BLOOD CELL COUNT: 3.58
SODIUM: 135
WHITE BLOOD CELL COUNT: 3

## 2023-05-31 ENCOUNTER — TELEPHONE ENCOUNTER (OUTPATIENT)
Dept: URBAN - METROPOLITAN AREA CLINIC 92 | Facility: CLINIC | Age: 33
End: 2023-05-31

## 2023-05-31 ENCOUNTER — WEB ENCOUNTER (OUTPATIENT)
Dept: URBAN - METROPOLITAN AREA CLINIC 86 | Facility: CLINIC | Age: 33
End: 2023-05-31

## 2023-05-31 NOTE — HPI-TODAY'S VISIT:
Dear Karen Ngo, The labs were sent to me.  The glucose is low at 40 and I would recommend monitoring for signs of hypoglycemia.  Creatinine 0.56, sodium 135, potassium 4.7, alkaline phosphatase 114, bilirubin 0.7, AST 36, ALT 23.  It seems the AST is stuck at 36.  The white blood cells low at 3.0, hemoglobin 8.7, MCV 80.2, platelets 145.  As discussed be sure to follow-up on the EGD.  Please let us know if there are any issues with the referral.  INR 1.1.  Also this is a reminder to schedule the MRI and let us know if there are issues with that as well.  Flory Kumar PA-C

## 2023-06-07 ENCOUNTER — WEB ENCOUNTER (OUTPATIENT)
Dept: URBAN - METROPOLITAN AREA CLINIC 86 | Facility: CLINIC | Age: 33
End: 2023-06-07

## 2023-06-09 ENCOUNTER — WEB ENCOUNTER (OUTPATIENT)
Dept: URBAN - METROPOLITAN AREA CLINIC 86 | Facility: CLINIC | Age: 33
End: 2023-06-09

## 2023-08-26 ENCOUNTER — LAB OUTSIDE AN ENCOUNTER (OUTPATIENT)
Dept: URBAN - METROPOLITAN AREA CLINIC 86 | Facility: CLINIC | Age: 33
End: 2023-08-26

## 2023-09-07 ENCOUNTER — TELEPHONE ENCOUNTER (OUTPATIENT)
Dept: URBAN - METROPOLITAN AREA CLINIC 86 | Facility: CLINIC | Age: 33
End: 2023-09-07

## 2023-09-07 ENCOUNTER — OFFICE VISIT (OUTPATIENT)
Dept: URBAN - METROPOLITAN AREA CLINIC 86 | Facility: CLINIC | Age: 33
End: 2023-09-07
Payer: COMMERCIAL

## 2023-09-07 VITALS
HEIGHT: 61 IN | SYSTOLIC BLOOD PRESSURE: 103 MMHG | TEMPERATURE: 97.2 F | WEIGHT: 140 LBS | BODY MASS INDEX: 26.43 KG/M2 | DIASTOLIC BLOOD PRESSURE: 71 MMHG | HEART RATE: 84 BPM

## 2023-09-07 DIAGNOSIS — K74.60 HEPATIC CIRRHOSIS, UNSPECIFIED HEPATIC CIRRHOSIS TYPE, UNSPECIFIED WHETHER ASCITES PRESENT: ICD-10-CM

## 2023-09-07 DIAGNOSIS — K71.9 DRUG-INDUCED LIVER INJURY: ICD-10-CM

## 2023-09-07 DIAGNOSIS — D69.3 CHRONIC ITP (IDIOPATHIC THROMBOCYTOPENIA): ICD-10-CM

## 2023-09-07 DIAGNOSIS — D68.61 ANTIPHOSPHOLIPID ANTIBODY SYNDROME: ICD-10-CM

## 2023-09-07 DIAGNOSIS — Z98.890 HISTORY OF LIVER BIOPSY: ICD-10-CM

## 2023-09-07 DIAGNOSIS — I81 PVT (PORTAL VEIN THROMBOSIS): ICD-10-CM

## 2023-09-07 DIAGNOSIS — R74.8 ELEVATED LIVER ENZYMES: ICD-10-CM

## 2023-09-07 DIAGNOSIS — Z79.899 HIGH RISK MEDICATION USE: ICD-10-CM

## 2023-09-07 DIAGNOSIS — I85.00 ESOPHAGEAL VARICES WITHOUT BLEEDING, UNSPECIFIED ESOPHAGEAL VARICES TYPE: ICD-10-CM

## 2023-09-07 PROCEDURE — 99214 OFFICE O/P EST MOD 30 MIN: CPT | Performed by: PHYSICIAN ASSISTANT

## 2023-09-07 RX ORDER — FOLIC ACID 1 MG/1
1 TABLET TABLET ORAL ONCE A DAY
Status: ACTIVE | COMMUNITY

## 2023-09-07 RX ORDER — CHOLECALCIFEROL (VITAMIN D3) 50 MCG
1 TABLET TABLET ORAL ONCE A DAY
Status: ACTIVE | COMMUNITY

## 2023-09-07 RX ORDER — HYDROXYCHLOROQUINE SULFATE 200 MG/1
AS DIRECTED TABLET, FILM COATED ORAL
Status: ACTIVE | COMMUNITY

## 2023-09-07 NOTE — HPI-TODAY'S VISIT:
Dear Karen Ngo,   The recent 9/7/23 labs were sent to me. The bilirubin normal at 0.6, alkaline phosphatase 111, ast 41, alt 29. Goal is less than 25 for the ast and alt. We will continue to monitor this. Inr 1.6.  Good seeing you and will let you know when I have that MRI report.   Flory Kumar PA-C

## 2023-09-07 NOTE — HPI-TODAY'S VISIT:
This is a 32 year old female who was recently seen in the hospital by Dr. Shona Fuller who presetns for evaluation of elevated liver enzymes.   9/7/23 9/7/23 iron 46, iron binding capacity 393, percent saturation.  Ferritin level 470 complete blood count with white blood cells 3.2, red blood cells 3.5, hemoglobin 9, and 85, platelets 152. they did not do liver enzymes she had her MRI yesterda but not read yet She is doing rituxan to help with her platelets as they were dropping.  She has the EGD scheduled for next friday.    recap 5/27/23 labsThe labs were sent to me.  The glucose is low at 40 and I would recommend monitoring for signs of hypoglycemia.  Creatinine 0.56, sodium 135, potassium 4.7, alkaline phosphatase 114, bilirubin 0.7, AST 36, ALT 23.  It seems the AST is stuck at 36.  The white blood cells low at 3.0, hemoglobin 8.7, MCV 80.2, platelets 145.  As discussed be sure to follow-up on the EGD.  Please let us know if there are any issues with the referral.  INR 1.1.  Also this is a reminder to schedule the MRI and let us know if there are issues with that as well.  Flory Kumar PA-C 5/26  The 5/2/23 labs The red blood cells low at 3.7, hemoglobin low at 9, mcv low at 78, platelets low at 131.Glucose 105, creatinine 0.62, sodium 137, potassium 3.9, bilirubin 0.6, albumin low at 3.4, alkaline phosphatase 102, ast 36, alt 30. INR 1.1. It appears the liver enzymes are slightly higher and sometimes the steriods can increase  no new meds, was on steriods in late april so sugars higher she may have had ibprofen around that time She will do labs today   4/21/23 Pt no showed appt last week. several attempts were made to contact and no answer.  She saw the hematologist. her platlets are low and going on steriods.  They are discussing rixutan  4/20/23 labs with alp 107, ast 37, 20. cr 0.63,  cbc with white blood cells 2.7, 3.54, hemoglobin 8.8, 39, mcv 81 happy to see the enzymes are better but concerned with platelets and they will be monitoring this check the immunoglobulins next week she is working on reducing the sugar in diet and red meat She will need updated imaging on the liver and clot  she is due for egd in june with dr. mckay   3/1/23 2/23/23 alk phos 392, ast 178, alt 212 igg 2438 bili 1.0 recap 2/13/23 labs with alp 578, ast 152, alt  117 2/9/23 652  .  2/7/23 alp 612, [id number], [id number] 2/6/23 alp 736, ast 162, alt 100.    Asked about diet and having more solid foods with grilled, baked foods and juicing. She is juicing a lot of fruits and vegetables with the benefiber. B12 folic, plaquenil, eliquis . She is drinkign a lot of beet juice and beets and mixings in the fruits and apples and grapes. Mixes in the carrots and leafy greens. she is not doing any protein powders. She is also doing sea strauss and she will stop this, was not taking at last visit. She started this and the juicing with beets after the last visit.   recap 2/1/23 Patient with past medical history including history of liver biopsy, chronic portal vein thrombosis, antiphospholipid syndrome, chronic idiopathic thrombocytopenia.  She is listed taking apixaban 5 mg twice daily, vitamin D, folic acid 1 mg, hydroxychloroquine alone 20 mg, pantoprazole 40 mg, presented to the hospital with right upper quadrant pain and elevated liver function test.  CT showing the chronic portal vein occlusion with cavernous formation and changes of chronic liver disease.  While in the hospital acute hepatitis panel negative and they suspected the elevation in her labs is due to the various supplements she was taking including dandelion root, tumeric, ashwaganda and this was stopped. She had a cholecystectomy while in the hospital.   1/28/23 labs cr 0.59, sodium 138, potassium 3.6, alp 359, alt 69, ast88, bilirubin 0.5. wbc 3.4, hemoglobin 9.0, mcv 84,  Aug 2022 labs alp 213, ast 53, alt 50 MRI 7/2022 liver w/changes of chronic liver disase and stable chronic thrombosis of main and right pv with cavernous transformation. thrombosis of splenic vein. Esophageal, perisplenic, and perigastric varices.  Last egd in June and banding was done  EGD done in june and 1 grade 1 varice was banded and will repeat in 1 year. Denies any issues with swelling.  Saw the surgeon yesterday, drain removed and surgical sites healing well. Still some pain and fatigue but slowly getting better. She is still off of the supplements. She had labs done yesterday and 2/3/23 alp 640, ast 151, alt 88 cr 0.74 and asked about what changed and found to be taking motrin and suspect this is the cause and she has not changed anything else. DIscussed NSAIDS and cautioned on this Discussed we will montior the labs and cautioned on other supplements.

## 2023-09-08 ENCOUNTER — TELEPHONE ENCOUNTER (OUTPATIENT)
Dept: URBAN - METROPOLITAN AREA CLINIC 86 | Facility: CLINIC | Age: 33
End: 2023-09-08

## 2023-09-08 ENCOUNTER — WEB ENCOUNTER (OUTPATIENT)
Dept: URBAN - METROPOLITAN AREA CLINIC 86 | Facility: CLINIC | Age: 33
End: 2023-09-08

## 2023-09-08 NOTE — HPI-TODAY'S VISIT:
Dear Karen Ngo,   The final MRI report was sent to me. The lower thorax appeared normal.  The liver without fat or iron.  They did see morphologic changes of chronic liver disease with unchanged or stable cavernous transformation of the main portal vein.  They did see varices.  This is why it is important that you are doing the EGD soon.  They did not see any suspicious lesions.  They did see a shunt near the inferior vena cava but they did not think it was suspicious.  I saw extensive varices encasing the common duct without significant upstream biliary ductal dilatation.  The spleen measures up to 13.4 cm.  This is slightly enlarged but stable.  The lymph nodes appeared normal vessels appear normal.  Pancreas adrenal glands and kidneys all normal.  Overall they thought this is a stable MRI showing the changes of the chronic liver disease and varices with did not see suspicious lesions.  If you recall he did have that biopsy that did not show extensive fibrosis or evidence of chronic liver disease. I need to see if the radiologist can update his reading as they noted the gallbladder was decompressed but yours was removed this past year.   Flory Kumar PA-C

## 2023-12-04 ENCOUNTER — LAB OUTSIDE AN ENCOUNTER (OUTPATIENT)
Dept: URBAN - METROPOLITAN AREA CLINIC 86 | Facility: CLINIC | Age: 33
End: 2023-12-04

## 2023-12-07 ENCOUNTER — OFFICE VISIT (OUTPATIENT)
Dept: URBAN - METROPOLITAN AREA TELEHEALTH 2 | Facility: TELEHEALTH | Age: 33
End: 2023-12-07
Payer: COMMERCIAL

## 2023-12-07 DIAGNOSIS — K74.60 HEPATIC CIRRHOSIS: ICD-10-CM

## 2023-12-07 DIAGNOSIS — R74.8 ELEVATED LIVER ENZYMES: ICD-10-CM

## 2023-12-07 DIAGNOSIS — K71.9 DRUG-INDUCED LIVER INJURY: ICD-10-CM

## 2023-12-07 DIAGNOSIS — I85.00 ESOPHAGEAL VARICES WITHOUT BLEEDING: ICD-10-CM

## 2023-12-07 PROCEDURE — 99214 OFFICE O/P EST MOD 30 MIN: CPT | Performed by: PHYSICIAN ASSISTANT

## 2023-12-07 RX ORDER — FOLIC ACID 1 MG/1
1 TABLET TABLET ORAL ONCE A DAY
Status: ACTIVE | COMMUNITY

## 2023-12-07 RX ORDER — HYDROXYCHLOROQUINE SULFATE 200 MG/1
AS DIRECTED TABLET, FILM COATED ORAL
Status: ACTIVE | COMMUNITY

## 2023-12-07 RX ORDER — CHOLECALCIFEROL (VITAMIN D3) 50 MCG
1 TABLET TABLET ORAL ONCE A DAY
Status: ACTIVE | COMMUNITY

## 2023-12-07 NOTE — HPI-TODAY'S VISIT:
This is a 32 year old female who was recently seen in the hospital by Dr. Shona Fuller who presetns for evaluation of elevated liver enzymes.    12/7/23 telemed  11/2023 labs with na 139, cr 0.72, alp 107, ast 35, alt 22, tb 0.5seeing hematology and discussed the platelets and are monitoring had EGD and needs bands so they are repeating in jan 2024 seeing cardiology and having echo tomorrow and also was referred to neuropsych due to palpitations   9/2023 The final MRI report was sent to me. The lower thorax appeared normal. The liver without fat or iron. They did see morphologic changes of chronic liver disease with unchanged or stable cavernous transformation of the main portal vein. They did see varices. This is why it is important that you are doing the EGD soon. They did not see any suspicious lesions. They did see a shunt near the inferior vena cava but they did not think it was suspicious. They saw extensive varices encasing the common duct without significant upstream biliary ductal dilatation. The spleen measures up to 13.4 cm. This is slightly enlarged but stable. The lymph nodes appeared normal vessels appear normal. Pancreas adrenal glands and kidneys all normal. Overall they thought this is a stable MRI showing the changes of the chronic liver disease and varices with did not see suspicious lesions. If you recall you did have that biopsy that did not show extensive fibrosis or evidence of chronic liver disease.   we reviewed mri abck in sept disucssed need to stay on imaging and egds.    recap  9/7/23 9/7/23 iron 46, iron binding capacity 393, percent saturation.  Ferritin level 470 complete blood count with white blood cells 3.2, red blood cells 3.5, hemoglobin 9, and 85, platelets 152. they did not do liver enzymes she had her MRI yesterda but not read yet She is doing rituxan to help with her platelets as they were dropping.  She has the EGD scheduled for next friday.    recap 5/27/23 labsThe labs were sent to me.  The glucose is low at 40 and I would recommend monitoring for signs of hypoglycemia.  Creatinine 0.56, sodium 135, potassium 4.7, alkaline phosphatase 114, bilirubin 0.7, AST 36, ALT 23.  It seems the AST is stuck at 36.  The white blood cells low at 3.0, hemoglobin 8.7, MCV 80.2, platelets 145.  As discussed be sure to follow-up on the EGD.  Please let us know if there are any issues with the referral.  INR 1.1.  Also this is a reminder to schedule the MRI and let us know if there are issues with that as well.  Flory Kumar PA-C 5/26  The 5/2/23 labs The red blood cells low at 3.7, hemoglobin low at 9, mcv low at 78, platelets low at 131.Glucose 105, creatinine 0.62, sodium 137, potassium 3.9, bilirubin 0.6, albumin low at 3.4, alkaline phosphatase 102, ast 36, alt 30. INR 1.1. It appears the liver enzymes are slightly higher and sometimes the steriods can increase  no new meds, was on steriods in late april so sugars higher she may have had ibprofen around that time She will do labs today   4/21/23 Pt no showed appt last week. several attempts were made to contact and no answer.  She saw the hematologist. her platlets are low and going on steriods.  They are discussing rixutan  4/20/23 labs with alp 107, ast 37, 20. cr 0.63,  cbc with white blood cells 2.7, 3.54, hemoglobin 8.8, 39, mcv 81 happy to see the enzymes are better but concerned with platelets and they will be monitoring this check the immunoglobulins next week she is working on reducing the sugar in diet and red meat She will need updated imaging on the liver and clot  she is due for egd in june with dr. mckay   3/1/23 2/23/23 alk phos 392, ast 178, alt 212 igg 2438 bili 1.0 recap 2/13/23 labs with alp 578, ast 152, alt  117 2/9/23 652  .  2/7/23 alp 612, [id number], [id number] 2/6/23 alp 736, ast 162, alt 100.    Asked about diet and having more solid foods with grilled, baked foods and juicing. She is juicing a lot of fruits and vegetables with the benefiber. B12 folic, plaquenil, eliquis . She is drinkign a lot of beet juice and beets and mixings in the fruits and apples and grapes. Mixes in the carrots and leafy greens. she is not doing any protein powders. She is also doing sea strauss and she will stop this, was not taking at last visit. She started this and the juicing with beets after the last visit.   recap 2/1/23 Patient with past medical history including history of liver biopsy, chronic portal vein thrombosis, antiphospholipid syndrome, chronic idiopathic thrombocytopenia.  She is listed taking apixaban 5 mg twice daily, vitamin D, folic acid 1 mg, hydroxychloroquine alone 20 mg, pantoprazole 40 mg, presented to the hospital with right upper quadrant pain and elevated liver function test.  CT showing the chronic portal vein occlusion with cavernous formation and changes of chronic liver disease.  While in the hospital acute hepatitis panel negative and they suspected the elevation in her labs is due to the various supplements she was taking including dandelion root, tumeric, ashwaganda and this was stopped. She had a cholecystectomy while in the hospital.   1/28/23 labs cr 0.59, sodium 138, potassium 3.6, alp 359, alt 69, ast88, bilirubin 0.5. wbc 3.4, hemoglobin 9.0, mcv 84,  Aug 2022 labs alp 213, ast 53, alt 50 MRI 7/2022 liver w/changes of chronic liver disase and stable chronic thrombosis of main and right pv with cavernous transformation. thrombosis of splenic vein. Esophageal, perisplenic, and perigastric varices.  Last egd in June and banding was done  EGD done in june and 1 grade 1 varice was banded and will repeat in 1 year. Denies any issues with swelling.  Saw the surgeon yesterday, drain removed and surgical sites healing well. Still some pain and fatigue but slowly getting better. She is still off of the supplements. She had labs done yesterday and 2/3/23 alp 640, ast 151, alt 88 cr 0.74 and asked about what changed and found to be taking motrin and suspect this is the cause and she has not changed anything else. DIscussed NSAIDS and cautioned on this Discussed we will montior the labs and cautioned on other supplements.

## 2024-01-04 ENCOUNTER — LAB OUTSIDE AN ENCOUNTER (OUTPATIENT)
Dept: URBAN - METROPOLITAN AREA TELEHEALTH 2 | Facility: TELEHEALTH | Age: 34
End: 2024-01-04

## 2024-01-04 LAB
A/G RATIO: 1
ABSOLUTE BASOPHILS: 9
ABSOLUTE EOSINOPHILS: 31
ABSOLUTE LYMPHOCYTES: 825
ABSOLUTE MONOCYTES: 233
ABSOLUTE NEUTROPHILS: 2003
ALBUMIN: 4
ALKALINE PHOSPHATASE: 130
ALT (SGPT): 23
AST (SGOT): 40
BASOPHILS: 0.3
BILIRUBIN, TOTAL: 0.6
BUN/CREATININE RATIO: (no result)
BUN: 9
CALCIUM: 8.9
CARBON DIOXIDE, TOTAL: 24
CHLORIDE: 108
COMMENT(S): (no result)
CREATININE: 0.69
EGFR: 119
EOSINOPHILS: 1
GLOBULIN, TOTAL: 4.1
GLUCOSE: 74
HEMATOCRIT: 34.4
HEMOGLOBIN: 10.9
INR: 1.2
LYMPHOCYTES: 26.6
MCH: 27.7
MCHC: 31.7
MCV: 87.3
MONOCYTES: 7.5
MPV: 11.8
NEUTROPHILS: 64.6
PLATELET COUNT: 75
POTASSIUM: 4.1
PROTEIN, TOTAL: 8.1
PT: 12.1
RDW: 13.1
RED BLOOD CELL COUNT: 3.94
SODIUM: 138
WHITE BLOOD CELL COUNT: 3.1

## 2024-01-08 ENCOUNTER — TELEPHONE ENCOUNTER (OUTPATIENT)
Dept: URBAN - METROPOLITAN AREA CLINIC 86 | Facility: CLINIC | Age: 34
End: 2024-01-08

## 2024-01-08 NOTE — HPI-TODAY'S VISIT:
Dear Karen Ngo,  The 1/3/24 labs were sent to me.  The white blood cells 3.1, hemoglobin 10.9, MCV 87, platelets 75 and this is low.  Please share with your other providers including the hematologist.  Glucose 74, creatinine 0.69, sodium 138, potassium 4.1, bilirubin 0.6, alkaline phosphatase 130, AST 40, ALT 23.  Goal for the AST and ALT is less than 25.  Previously back in November the AST was 35 and ALT was 22 so slightly higher.  The alkaline phosphatase was 107 at that time.  These elevations are mild and could be diet related or if you have been less active and gained any weight.  We will continue to monitor as discussed. Flory Kumar PA-C

## 2024-03-01 ENCOUNTER — OV EP (OUTPATIENT)
Dept: URBAN - METROPOLITAN AREA CLINIC 86 | Facility: CLINIC | Age: 34
End: 2024-03-01
Payer: COMMERCIAL

## 2024-03-01 VITALS
TEMPERATURE: 97 F | WEIGHT: 143 LBS | HEART RATE: 73 BPM | HEIGHT: 61 IN | BODY MASS INDEX: 27 KG/M2 | DIASTOLIC BLOOD PRESSURE: 71 MMHG | SYSTOLIC BLOOD PRESSURE: 106 MMHG

## 2024-03-01 DIAGNOSIS — K74.60 HEPATIC CIRRHOSIS, UNSPECIFIED HEPATIC CIRRHOSIS TYPE, UNSPECIFIED WHETHER ASCITES PRESENT: ICD-10-CM

## 2024-03-01 DIAGNOSIS — D68.61 ANTIPHOSPHOLIPID ANTIBODY SYNDROME: ICD-10-CM

## 2024-03-01 DIAGNOSIS — I85.00 ESOPHAGEAL VARICES WITHOUT BLEEDING, UNSPECIFIED ESOPHAGEAL VARICES TYPE: ICD-10-CM

## 2024-03-01 DIAGNOSIS — I81 PVT (PORTAL VEIN THROMBOSIS): ICD-10-CM

## 2024-03-01 DIAGNOSIS — K71.9 DRUG-INDUCED LIVER INJURY: ICD-10-CM

## 2024-03-01 DIAGNOSIS — Z98.890 HISTORY OF LIVER BIOPSY: ICD-10-CM

## 2024-03-01 DIAGNOSIS — D69.3 CHRONIC ITP (IDIOPATHIC THROMBOCYTOPENIA): ICD-10-CM

## 2024-03-01 DIAGNOSIS — R74.8 ELEVATED LIVER ENZYMES: ICD-10-CM

## 2024-03-01 DIAGNOSIS — Z79.899 HIGH RISK MEDICATION USE: ICD-10-CM

## 2024-03-01 PROCEDURE — 99214 OFFICE O/P EST MOD 30 MIN: CPT | Performed by: PHYSICIAN ASSISTANT

## 2024-03-01 RX ORDER — HYDROXYCHLOROQUINE SULFATE 200 MG/1
AS DIRECTED TABLET, FILM COATED ORAL
Status: ACTIVE | COMMUNITY

## 2024-03-01 RX ORDER — CHOLECALCIFEROL (VITAMIN D3) 50 MCG
1 TABLET TABLET ORAL ONCE A DAY
Status: ON HOLD | COMMUNITY

## 2024-03-01 RX ORDER — AZATHIOPRINE 50 MG/1
AS DIRECTED TABLET ORAL
Status: ACTIVE | COMMUNITY

## 2024-03-01 RX ORDER — FOLIC ACID 1 MG/1
1 TABLET TABLET ORAL ONCE A DAY
Status: ON HOLD | COMMUNITY

## 2024-03-01 NOTE — HPI-TODAY'S VISIT:
This is a 32 year old female who was recently seen in the hospital by Dr. Shona Fuller who presetns for evaluation of elevated liver enzymes.   3/1/24 ov  The 1/3/24 labs were sent to me. The white blood cells 3.1, hemoglobin 10.9, MCV 87, platelets 75 and this is low. Please share with your other providers including the hematologist. Glucose 74, creatinine 0.69, sodium 138, potassium 4.1, bilirubin 0.6, alkaline phosphatase 130, AST 40, ALT 23. Goal for the AST and ALT is less than 25. Previously back in November the AST was 35 and ALT was 22 so slightly higher. = Flory Kumar PA-C saw labs in 2/1/24  and ast 52, alt 26 she had the EGD done and they small varices and that is due to the portal htn but they did not have any bands  she is not on BB and per dr. ford this type of portal htn does not realy respond to that  she started imuran back 4 weeks ago back on eliquis and plaquenil she is not on any other vitamins.  she was on imuran before and no issues.  discussed need to monitor while on the imuran.  check labs again today and in 6 weeks and telemed after.  historically more sensitive to different supplements and meds so need to monitor   recap 12/7/23 telemed  11/2023 labs with na 139, cr 0.72, alp 107, ast 35, alt 22, tb 0.5seeing hematology and discussed the platelets and are monitoring had EGD and needs bands so they are repeating in jan 2024 seeing cardiology and having echo tomorrow and also was referred to neuropsych due to palpitations   9/2023 The final MRI report was sent to me. The lower thorax appeared normal. The liver without fat or iron. They did see morphologic changes of chronic liver disease with unchanged or stable cavernous transformation of the main portal vein. They did see varices. This is why it is important that you are doing the EGD soon. They did not see any suspicious lesions. They did see a shunt near the inferior vena cava but they did not think it was suspicious. They saw extensive varices encasing the common duct without significant upstream biliary ductal dilatation. The spleen measures up to 13.4 cm. This is slightly enlarged but stable. The lymph nodes appeared normal vessels appear normal. Pancreas adrenal glands and kidneys all normal. Overall they thought this is a stable MRI showing the changes of the chronic liver disease and varices with did not see suspicious lesions. If you recall you did have that biopsy that did not show extensive fibrosis or evidence of chronic liver disease.   we reviewed mri abck in sept disucssed need to stay on imaging and egds.    recap  9/7/23 9/7/23 iron 46, iron binding capacity 393, percent saturation.  Ferritin level 470 complete blood count with white blood cells 3.2, red blood cells 3.5, hemoglobin 9, and 85, platelets 152. they did not do liver enzymes she had her MRI yesterda but not read yet She is doing rituxan to help with her platelets as they were dropping.  She has the EGD scheduled for next friday.    recap 5/27/23 labsThe labs were sent to me.  The glucose is low at 40 and I would recommend monitoring for signs of hypoglycemia.  Creatinine 0.56, sodium 135, potassium 4.7, alkaline phosphatase 114, bilirubin 0.7, AST 36, ALT 23.  It seems the AST is stuck at 36.  The white blood cells low at 3.0, hemoglobin 8.7, MCV 80.2, platelets 145.  As discussed be sure to follow-up on the EGD.  Please let us know if there are any issues with the referral.  INR 1.1.  Also this is a reminder to schedule the MRI and let us know if there are issues with that as well.  Flory Kumar PA-C 5/26  The 5/2/23 labs The red blood cells low at 3.7, hemoglobin low at 9, mcv low at 78, platelets low at 131.Glucose 105, creatinine 0.62, sodium 137, potassium 3.9, bilirubin 0.6, albumin low at 3.4, alkaline phosphatase 102, ast 36, alt 30. INR 1.1. It appears the liver enzymes are slightly higher and sometimes the steriods can increase  no new meds, was on steriods in late april so sugars higher she may have had ibprofen around that time She will do labs today   4/21/23 Pt no showed appt last week. several attempts were made to contact and no answer.  She saw the hematologist. her platlets are low and going on steriods.  They are discussing rixutan  4/20/23 labs with alp 107, ast 37, 20. cr 0.63,  cbc with white blood cells 2.7, 3.54, hemoglobin 8.8, 39, mcv 81 happy to see the enzymes are better but concerned with platelets and they will be monitoring this check the immunoglobulins next week she is working on reducing the sugar in diet and red meat She will need updated imaging on the liver and clot  she is due for egd in june with dr. mckay   3/1/23 2/23/23 alk phos 392, ast 178, alt 212 igg 2438 bili 1.0 recap 2/13/23 labs with alp 578, ast 152, alt  117 2/9/23 652  .  2/7/23 alp 612, [id number], [id number] 2/6/23 alp 736, ast 162, alt 100.    Asked about diet and having more solid foods with grilled, baked foods and juicing. She is juicing a lot of fruits and vegetables with the benefiber. B12 folic, plaquenil, eliquis . She is drinkign a lot of beet juice and beets and mixings in the fruits and apples and grapes. Mixes in the carrots and leafy greens. she is not doing any protein powders. She is also doing sea strauss and she will stop this, was not taking at last visit. She started this and the juicing with beets after the last visit.   recap 2/1/23 Patient with past medical history including history of liver biopsy, chronic portal vein thrombosis, antiphospholipid syndrome, chronic idiopathic thrombocytopenia.  She is listed taking apixaban 5 mg twice daily, vitamin D, folic acid 1 mg, hydroxychloroquine alone 20 mg, pantoprazole 40 mg, presented to the hospital with right upper quadrant pain and elevated liver function test.  CT showing the chronic portal vein occlusion with cavernous formation and changes of chronic liver disease.  While in the hospital acute hepatitis panel negative and they suspected the elevation in her labs is due to the various supplements she was taking including dandelion root, tumeric, ashwaganda and this was stopped. She had a cholecystectomy while in the hospital.   1/28/23 labs cr 0.59, sodium 138, potassium 3.6, alp 359, alt 69, ast88, bilirubin 0.5. wbc 3.4, hemoglobin 9.0, mcv 84,  Aug 2022 labs alp 213, ast 53, alt 50 MRI 7/2022 liver w/changes of chronic liver disase and stable chronic thrombosis of main and right pv with cavernous transformation. thrombosis of splenic vein. Esophageal, perisplenic, and perigastric varices.  Last egd in June and banding was done  EGD done in june and 1 grade 1 varice was banded and will repeat in 1 year. Denies any issues with swelling.  Saw the surgeon yesterday, drain removed and surgical sites healing well. Still some pain and fatigue but slowly getting better. She is still off of the supplements. She had labs done yesterday and 2/3/23 alp 640, ast 151, alt 88 cr 0.74 and asked about what changed and found to be taking motrin and suspect this is the cause and she has not changed anything else. DIscussed NSAIDS and cautioned on this Discussed we will montior the labs and cautioned on other supplements.

## 2024-03-13 ENCOUNTER — OV EP (OUTPATIENT)
Dept: URBAN - METROPOLITAN AREA CLINIC 92 | Facility: CLINIC | Age: 34
End: 2024-03-13
Payer: COMMERCIAL

## 2024-03-13 VITALS
BODY MASS INDEX: 27.19 KG/M2 | TEMPERATURE: 97 F | DIASTOLIC BLOOD PRESSURE: 77 MMHG | WEIGHT: 144 LBS | HEIGHT: 61 IN | SYSTOLIC BLOOD PRESSURE: 112 MMHG | HEART RATE: 78 BPM

## 2024-03-13 DIAGNOSIS — K74.60 HEPATIC CIRRHOSIS, UNSPECIFIED HEPATIC CIRRHOSIS TYPE, UNSPECIFIED WHETHER ASCITES PRESENT: ICD-10-CM

## 2024-03-13 DIAGNOSIS — M32.9 LUPUS: ICD-10-CM

## 2024-03-13 DIAGNOSIS — R19.7 DIARRHEA, UNSPECIFIED TYPE: ICD-10-CM

## 2024-03-13 DIAGNOSIS — I85.00 VARICES, ESOPHAGEAL: ICD-10-CM

## 2024-03-13 DIAGNOSIS — Z90.49 HISTORY OF CHOLECYSTECTOMY: ICD-10-CM

## 2024-03-13 PROBLEM — 428882003: Status: ACTIVE | Noted: 2024-03-13

## 2024-03-13 PROCEDURE — 99213 OFFICE O/P EST LOW 20 MIN: CPT

## 2024-03-13 RX ORDER — CHOLECALCIFEROL (VITAMIN D3) 50 MCG
1 TABLET TABLET ORAL ONCE A DAY
Status: DISCONTINUED | COMMUNITY

## 2024-03-13 RX ORDER — CHOLESTYRAMINE 4 G/9G
1 PACKET MIXED WITH WATER OR NON-CARBONATED DRINK POWDER, FOR SUSPENSION ORAL ONCE A DAY
Qty: 30 | OUTPATIENT
Start: 2024-03-13

## 2024-03-13 RX ORDER — AZATHIOPRINE 50 MG/1
AS DIRECTED TABLET ORAL
Status: ACTIVE | COMMUNITY

## 2024-03-13 RX ORDER — HYDROXYCHLOROQUINE SULFATE 200 MG/1
AS DIRECTED TABLET, FILM COATED ORAL
Status: ACTIVE | COMMUNITY

## 2024-03-13 RX ORDER — FOLIC ACID 1 MG/1
1 TABLET TABLET ORAL ONCE A DAY
Status: DISCONTINUED | COMMUNITY

## 2024-03-13 NOTE — HPI-TODAY'S VISIT:
33-year-old female presents today due to diarrhea. She has hx of PVT c/b cirrhosis, on Eliquis, SLE, ITP, recurrent PEs, CCY jan 2023. Patient states that she has always had issues with her digestive system but this has been exacerbated since having a CCY in January 2023.  She is now experiencing more frequent bowel movements that are loose and then will have intermittent constipation.  She has to take Imodium to firm up her stool.  She feels as though she has to have a bowel movement after every meal.  She eats a clean diet and stays away from trigger foods such as fried fatty meals.  She denies any hematochezia, melena or significant weight loss.  She also has increased periumbilical stomach cramping after she ate certain types of foods.  She also experiences nausea along with this.  Denies any vomiting, GERD symptoms, dysphagia, odynophagia, rare NSAID use. She does have lupus which was diagnosed as a child.  She is currently on Plaquenil daily.  She does have history of chronic anemia Has had esophageal varices s/p banding at [place], and she follows over there to get EGDs annually. EGD 1/30/24: small varcies, portal htn gastropathy

## 2024-03-15 LAB
CALPROTECTIN, FECAL: (no result)
ENDOMYSIAL ANTIBODY IGA: NEGATIVE
IMMUNOGLOBULIN A, QN, SERUM: 306
PANCREATIC ELASTASE, FECAL: (no result)
REQUEST PROBLEM: (no result)
T-TRANSGLUTAMINASE (TTG) IGA: <2

## 2024-03-20 ENCOUNTER — LAB (OUTPATIENT)
Dept: URBAN - METROPOLITAN AREA CLINIC 92 | Facility: CLINIC | Age: 34
End: 2024-03-20

## 2024-04-08 ENCOUNTER — LAB (OUTPATIENT)
Dept: URBAN - METROPOLITAN AREA CLINIC 86 | Facility: CLINIC | Age: 34
End: 2024-04-08

## 2024-04-12 ENCOUNTER — TELEP (OUTPATIENT)
Dept: URBAN - METROPOLITAN AREA TELEHEALTH 2 | Facility: TELEHEALTH | Age: 34
End: 2024-04-12
Payer: COMMERCIAL

## 2024-04-12 VITALS — BODY MASS INDEX: 26.43 KG/M2 | WEIGHT: 140 LBS | HEIGHT: 61 IN

## 2024-04-12 DIAGNOSIS — K71.9 DRUG-INDUCED LIVER INJURY: ICD-10-CM

## 2024-04-12 DIAGNOSIS — Z98.890 HISTORY OF LIVER BIOPSY: ICD-10-CM

## 2024-04-12 DIAGNOSIS — I85.00 ESOPHAGEAL VARICES WITHOUT BLEEDING, UNSPECIFIED ESOPHAGEAL VARICES TYPE: ICD-10-CM

## 2024-04-12 DIAGNOSIS — K74.60 HEPATIC CIRRHOSIS, UNSPECIFIED HEPATIC CIRRHOSIS TYPE, UNSPECIFIED WHETHER ASCITES PRESENT: ICD-10-CM

## 2024-04-12 DIAGNOSIS — R74.8 ELEVATED LIVER ENZYMES: ICD-10-CM

## 2024-04-12 DIAGNOSIS — Z79.899 HIGH RISK MEDICATION USE: ICD-10-CM

## 2024-04-12 DIAGNOSIS — D68.61 ANTIPHOSPHOLIPID ANTIBODY SYNDROME: ICD-10-CM

## 2024-04-12 DIAGNOSIS — I81 PVT (PORTAL VEIN THROMBOSIS): ICD-10-CM

## 2024-04-12 DIAGNOSIS — D69.3 CHRONIC ITP (IDIOPATHIC THROMBOCYTOPENIA): ICD-10-CM

## 2024-04-12 PROCEDURE — 99214 OFFICE O/P EST MOD 30 MIN: CPT | Performed by: PHYSICIAN ASSISTANT

## 2024-04-12 RX ORDER — ESCITALOPRAM OXALATE 5 MG/1
1 TABLET TABLET, FILM COATED ORAL ONCE A DAY
Status: ACTIVE | COMMUNITY

## 2024-04-12 RX ORDER — AZATHIOPRINE 50 MG/1
AS DIRECTED TABLET ORAL
Status: ACTIVE | COMMUNITY

## 2024-04-12 RX ORDER — PANCRELIPASE 36000; 180000; 114000 [USP'U]/1; [USP'U]/1; [USP'U]/1
AS DIRECTED CAPSULE, DELAYED RELEASE PELLETS ORAL
Qty: 900 | Refills: 1 | Status: ACTIVE | COMMUNITY
Start: 2024-03-20 | End: 2024-09-16

## 2024-04-12 RX ORDER — CHOLESTYRAMINE 4 G/9G
1 PACKET MIXED WITH WATER OR NON-CARBONATED DRINK POWDER, FOR SUSPENSION ORAL ONCE A DAY
Qty: 30 | Status: ACTIVE | COMMUNITY
Start: 2024-03-13

## 2024-04-12 RX ORDER — HYDROXYCHLOROQUINE SULFATE 200 MG/1
AS DIRECTED TABLET, FILM COATED ORAL
Status: ACTIVE | COMMUNITY

## 2024-04-12 NOTE — HPI-TODAY'S VISIT:
This is a 32 year old female who was recently seen in the hospital by Dr. Shona Fuller who presetns for evaluation of elevated liver enzymes.   4/11/24 telemed  4/3/24 MRI EXAM: MR ABDOMEN WITH AND WITHOUT CONTRAST  CLINICAL INDICATION:  - Exocrine pancreatic insufficiency. Chronic portal vein thrombosis. SLE. Elevated liver enzymes. Hepatic cirrhosis. Chronic ITP.  TECHNIQUE: Multiplanar multisequence images were obtained through the abdomen without and with IV administration of 14 mL of Clariscan.  COMPARISON: Comparison is made to an outside prior abdominal MRI from September 6, 2023.  FINDINGS:  Evaluation of the visualized lower chest demonstrates the heart is within normal limits in size. There is no significant sized pleural or pericardial effusion at the visualized lung bases. Please note the lungs are suboptimally evaluated with MRI.  The liver demonstrates cirrhotic liver morphology with enlargement of the caudate lobe and mild nodularity/lobulation of the liver contour. The left hepatic lobe is relatively small in size. There is no significant hepatic steatosis. There is a arterial enhancing lesion in the posterior segment of the right hepatic lobe at the margin of the inferior vena cava measuring 2.4 x 2.8 cm (series 20 image 25 which demonstrates homogeneous arterial enhancement. Mild postcontrast enhancement persists on portal venous and delayed phase images without washout or capsular enhancement. This lesion is slightly hyperintense with respect to the liver on T2-weighted images. There is no significant signal drop within the lesion on T1-weighted out of phase images and this lesion is isointense to slightly hyperintense with respect to the liver on T1-weighted noncontrast images. Evaluation for diffusion restriction is limited by significant motion. There is no significant diffusion restriction within the limitation. This lesion is stable in size and appearance compared to the prior MRI from September 6, 2023. There are also several small subcentimeter foci of arterial enhancement in the liver for example in the anterior segment of the right hepatic lobe measuring 5 mm (series 20 image 37) not clearly visualized on the remaining sequences most likely small foci of shunting/perfusion defects. The patient is status post cholecystectomy. Trace fluid in the gallbladder fossa may represent a prominent cystic duct or gallbladder remnant. There is no intra or extrahepatic biliary dilatation.  There is mild splenomegaly and the spleen measures 13.5 cm in AP dimension. There is a small splenule at the splenic hilum closely approximating the pancreatic tail. The spleen is otherwise unremarkable. The adrenal glands are unremarkable. There is mesenteric edema and trace ascites including trace peripancreatic fluid. There are a few punctate T2 hyperintense linear foci within the pancreas which are nonspecific but may represent prominent foci of sidebranch ectasia or may represent prominent peripancreatic collateral vessels projecting at the margin of the pancreas. T1 signal intensity of the pancreas is within normal limits. No soft tissue mass is identified within the pancreas. There is no dilatation of the main pancreatic duct.  The kidneys enhance symmetrically. There is no hydronephrosis.  The abdominal aorta is patent without aneurysmal dilatation. The celiac, SMA and inferior mesenteric arteries are patent. The superior mesenteric vein is patent. The splenic vein is not seen to be patent posterior to the pancreatic body but is grossly patent in the splenic hilum. There is no well-formed main portal vein entering the liver consistent with the patient's history of portal vein thrombosis and the portal vein is occluded at the portal confluence. There is evidence for cavernous transformation of the main portal vein with numerous collateral vessels in the abdomen including periportal, gastric, perigastric, perisplenic and esophageal and periesophageal varices.  Evaluation of the bowel demonstrates there is no evidence for mechanical intestinal obstruction. The bowel is not imaged in its entirety.  There is mesenteric edema. There is trace perisplenic fluid.  There is no abdominal lymphadenopathy by size criteria.  No suspicious osseous lesion is identified.   IMPRESSION: 1. Cirrhotic liver morphology. Arterial enhancing lesion in the posterior segment of the right hepatic lobe measuring 2.8 cm, stable in size and appearance compared to the outside prior MRI from September 2023. The primary differential is focal nodular hyperplasia/FNH-like lesion. Additional subcentimeter foci of arterial enhancement in the liver most likely small foci of shunting/perfusion defects. Continued attention on the 6-12 month follow-up examination is recommended. 2. Occlusion of the main portal vein with cavernous transformation, unchanged. Collateral vessels in the upper abdomen including at the gurjit hepatis, peripancreatic, perisplenic and esophageal, periesophageal varices. 3. Splenomegaly. Mesenteric edema. Trace abdominal ascites. 4. Mesenteric edema and trace perisplenic fluid which may be secondary to portal hypertension. Correlation with lipase values could be performed if there is concern for mild pancreatitis.  reviewed the above.  need update labs, she has orders.  seeing GI for pancreatic insuff.  recommend MRI at [facility] next time as se has done there prior and get more consistnet reads and noting a lot of movement changes   3/1/24 ov  The 1/3/24 labs were sent to me. The white blood cells 3.1, hemoglobin 10.9, MCV 87, platelets 75 and this is low. Please share with your other providers including the hematologist. Glucose 74, creatinine 0.69, sodium 138, potassium 4.1, bilirubin 0.6, alkaline phosphatase 130, AST 40, ALT 23. Goal for the AST and ALT is less than 25. Previously back in November the AST was 35 and ALT was 22 so slightly higher. = Flory Kumar PA-C saw labs in 2/1/24  and ast 52, alt 26 she had the EGD done and they small varices and that is due to the portal htn but they did not have any bands  she is not on BB and per dr. ford this type of portal htn does not realy respond to that  she started imuran back 4 weeks ago back on eliquis and plaquenil she is not on any other vitamins.  she was on imuran before and no issues.  discussed need to monitor while on the imuran.  check labs again today and in 6 weeks and telemed after.  historically more sensitive to different supplements and meds so need to monitor   recap 12/7/23 telemed  11/2023 labs with na 139, cr 0.72, alp 107, ast 35, alt 22, tb 0.5seeing hematology and discussed the platelets and are monitoring had EGD and needs bands so they are repeating in jan 2024 seeing cardiology and having echo tomorrow and also was referred to neuropsych due to palpitations   9/2023 The final MRI report was sent to me. The lower thorax appeared normal. The liver without fat or iron. They did see morphologic changes of chronic liver disease with unchanged or stable cavernous transformation of the main portal vein. They did see varices. This is why it is important that you are doing the EGD soon. They did not see any suspicious lesions. They did see a shunt near the inferior vena cava but they did not think it was suspicious. They saw extensive varices encasing the common duct without significant upstream biliary ductal dilatation. The spleen measures up to 13.4 cm. This is slightly enlarged but stable. The lymph nodes appeared normal vessels appear normal. Pancreas adrenal glands and kidneys all normal. Overall they thought this is a stable MRI showing the changes of the chronic liver disease and varices with did not see suspicious lesions. If you recall you did have that biopsy that did not show extensive fibrosis or evidence of chronic liver disease.   we reviewed mri abck in sept disucssed need to stay on imaging and egds.    recap  9/7/23 9/7/23 iron 46, iron binding capacity 393, percent saturation.  Ferritin level 470 complete blood count with white blood cells 3.2, red blood cells 3.5, hemoglobin 9, and 85, platelets 152. they did not do liver enzymes she had her MRI yesterda but not read yet She is doing rituxan to help with her platelets as they were dropping.  She has the EGD scheduled for next friday.    recap 5/27/23 labsThe labs were sent to me.  The glucose is low at 40 and I would recommend monitoring for signs of hypoglycemia.  Creatinine 0.56, sodium 135, potassium 4.7, alkaline phosphatase 114, bilirubin 0.7, AST 36, ALT 23.  It seems the AST is stuck at 36.  The white blood cells low at 3.0, hemoglobin 8.7, MCV 80.2, platelets 145.  As discussed be sure to follow-up on the EGD.  Please let us know if there are any issues with the referral.  INR 1.1.  Also this is a reminder to schedule the MRI and let us know if there are issues with that as well.  Flory Kumar PA-C 5/26  The 5/2/23 labs The red blood cells low at 3.7, hemoglobin low at 9, mcv low at 78, platelets low at 131.Glucose 105, creatinine 0.62, sodium 137, potassium 3.9, bilirubin 0.6, albumin low at 3.4, alkaline phosphatase 102, ast 36, alt 30. INR 1.1. It appears the liver enzymes are slightly higher and sometimes the steriods can increase  no new meds, was on steriods in late april so sugars higher she may have had ibprofen around that time She will do labs today   4/21/23 Pt no showed appt last week. several attempts were made to contact and no answer.  She saw the hematologist. her platlets are low and going on steriods.  They are discussing rixutan  4/20/23 labs with alp 107, ast 37, 20. cr 0.63,  cbc with white blood cells 2.7, 3.54, hemoglobin 8.8, 39, mcv 81 happy to see the enzymes are better but concerned with platelets and they will be monitoring this check the immunoglobulins next week she is working on reducing the sugar in diet and red meat She will need updated imaging on the liver and clot  she is due for egd in june with dr. mckay   3/1/23 2/23/23 alk phos 392, ast 178, alt 212 igg [id number] bili 1.0 recap 2/13/23 labs with alp 578, ast 152, alt  117 2/9/23 652  .  2/7/23 alp 612, [id number], [id number] 2/6/23 alp 736, ast 162, alt 100.    Asked about diet and having more solid foods with grilled, baked foods and juicing. She is juicing a lot of fruits and vegetables with the benefiber. B12 folic, plaquenil, eliquis . She is drinkign a lot of beet juice and beets and mixings in the fruits and apples and grapes. Mixes in the carrots and leafy greens. she is not doing any protein powders. She is also doing sea strauss and she will stop this, was not taking at last visit. She started this and the juicing with beets after the last visit.   recap 2/1/23 Patient with past medical history including history of liver biopsy, chronic portal vein thrombosis, antiphospholipid syndrome, chronic idiopathic thrombocytopenia.  She is listed taking apixaban 5 mg twice daily, vitamin D, folic acid 1 mg, hydroxychloroquine alone 20 mg, pantoprazole 40 mg, presented to the hospital with right upper quadrant pain and elevated liver function test.  CT showing the chronic portal vein occlusion with cavernous formation and changes of chronic liver disease.  While in the hospital acute hepatitis panel negative and they suspected the elevation in her labs is due to the various supplements she was taking including dandelion root, tumeric, ashwaganda and this was stopped. She had a cholecystectomy while in the hospital.   1/28/23 labs cr 0.59, sodium 138, potassium 3.6, alp 359, alt 69, ast88, bilirubin 0.5. wbc 3.4, hemoglobin 9.0, mcv 84,  Aug 2022 labs alp 213, ast 53, alt 50 MRI 7/2022 liver w/changes of chronic liver disase and stable chronic thrombosis of main and right pv with cavernous transformation. thrombosis of splenic vein. Esophageal, perisplenic, and perigastric varices.  Last egd in June and banding was done  EGD done in june and 1 grade 1 varice was banded and will repeat in 1 year. Denies any issues with swelling.  Saw the surgeon yesterday, drain removed and surgical sites healing well. Still some pain and fatigue but slowly getting better. She is still off of the supplements. She had labs done yesterday and 2/3/23 alp 640, ast 151, alt 88 cr 0.74 and asked about what changed and found to be taking motrin and suspect this is the cause and she has not changed anything else. DIscussed NSAIDS and cautioned on this Discussed we will montior the labs and cautioned on other supplements.

## 2024-05-15 ENCOUNTER — DASHBOARD ENCOUNTERS (OUTPATIENT)
Age: 34
End: 2024-05-15

## 2024-05-15 ENCOUNTER — OFFICE VISIT (OUTPATIENT)
Dept: URBAN - METROPOLITAN AREA CLINIC 92 | Facility: CLINIC | Age: 34
End: 2024-05-15
Payer: COMMERCIAL

## 2024-05-15 VITALS
TEMPERATURE: 98.2 F | BODY MASS INDEX: 26.43 KG/M2 | HEIGHT: 61 IN | WEIGHT: 140 LBS | DIASTOLIC BLOOD PRESSURE: 70 MMHG | SYSTOLIC BLOOD PRESSURE: 108 MMHG | HEART RATE: 80 BPM

## 2024-05-15 DIAGNOSIS — M32.9 LUPUS: ICD-10-CM

## 2024-05-15 DIAGNOSIS — K86.81 EXOCRINE PANCREATIC INSUFFICIENCY: ICD-10-CM

## 2024-05-15 DIAGNOSIS — K74.60 HEPATIC CIRRHOSIS, UNSPECIFIED HEPATIC CIRRHOSIS TYPE, UNSPECIFIED WHETHER ASCITES PRESENT: ICD-10-CM

## 2024-05-15 DIAGNOSIS — Z90.49 HISTORY OF CHOLECYSTECTOMY: ICD-10-CM

## 2024-05-15 DIAGNOSIS — I85.00 VARICES, ESOPHAGEAL: ICD-10-CM

## 2024-05-15 PROCEDURE — 99213 OFFICE O/P EST LOW 20 MIN: CPT

## 2024-05-15 RX ORDER — AZATHIOPRINE 50 MG/1
AS DIRECTED TABLET ORAL
Status: ACTIVE | COMMUNITY

## 2024-05-15 RX ORDER — ESCITALOPRAM OXALATE 5 MG/1
1 TABLET TABLET, FILM COATED ORAL ONCE A DAY
Status: ACTIVE | COMMUNITY

## 2024-05-15 RX ORDER — HYDROXYCHLOROQUINE SULFATE 200 MG/1
AS DIRECTED TABLET, FILM COATED ORAL
Status: ACTIVE | COMMUNITY

## 2024-05-15 RX ORDER — CHOLESTYRAMINE POWDER FOR SUSPENSION 4 G/8.78G
TAKE 1 PACKET MIXED WITH WATER OR NON-CARBONATED DRINK BY MOUTH ONCE A DAY POWDER, FOR SUSPENSION ORAL
Qty: 90 PACK | Refills: 1 | Status: ACTIVE | COMMUNITY

## 2024-05-15 RX ORDER — PANCRELIPASE 36000; 180000; 114000 [USP'U]/1; [USP'U]/1; [USP'U]/1
AS DIRECTED CAPSULE, DELAYED RELEASE PELLETS ORAL
Qty: 900 | Refills: 1 | Status: ACTIVE | COMMUNITY
Start: 2024-03-20 | End: 2024-10-09

## 2024-07-22 ENCOUNTER — LAB OUTSIDE AN ENCOUNTER (OUTPATIENT)
Dept: URBAN - METROPOLITAN AREA TELEHEALTH 2 | Facility: TELEHEALTH | Age: 34
End: 2024-07-22

## 2024-09-24 ENCOUNTER — TELEPHONE ENCOUNTER (OUTPATIENT)
Dept: URBAN - METROPOLITAN AREA CLINIC 86 | Facility: CLINIC | Age: 34
End: 2024-09-24

## 2024-09-24 NOTE — HPI-TODAY'S VISIT:
Dear Karen Ngo, The recent MRI was sent to me.  The liver without any fat or iron.  They saw changes of chronic liver disease.  They saw an area of enhancement in segment 7 measuring 3 x 2.3 cm and increased in size since September 2023 where was 2.6 x 1.9 cm and consistent with focal nodular hyperplasia which is benign and they recommend we check on this again in 3 to 6 months.  Liver stiffness 3.0 kPa.  This is consistent with stages 1-2 fibrosis.  The gallbladder and biliary tree with a fluid-filled cystic structure connected to the cystic duct and the gallbladder fossa which could be a patulous cyst or a gallbladder remnant.  This was seen prior.  Spleen at 14 cm.  The pancreas, adrenals, kidneys are normal.  The noted moderate colonic stool burden.  They noted a chronically thrombosed portal system with varices.  Overall they saw changes of chronic liver disease but with only stage I-II fibrosis which is good to note.  They noted that the area in segment 7 would need to be monitored with Eovist in 3 to 6 months and suspected this was focal nodular hyperplasia. We will review at the visit. Overall other than the segment 7 lesion this seems stable.  Flory Kumar PA-C

## 2024-10-01 ENCOUNTER — LAB OUTSIDE AN ENCOUNTER (OUTPATIENT)
Dept: URBAN - METROPOLITAN AREA TELEHEALTH 2 | Facility: TELEHEALTH | Age: 34
End: 2024-10-01

## 2024-10-04 ENCOUNTER — OFFICE VISIT (OUTPATIENT)
Dept: URBAN - METROPOLITAN AREA CLINIC 86 | Facility: CLINIC | Age: 34
End: 2024-10-04
Payer: COMMERCIAL

## 2024-10-04 VITALS
HEART RATE: 78 BPM | SYSTOLIC BLOOD PRESSURE: 105 MMHG | TEMPERATURE: 96.8 F | WEIGHT: 141 LBS | HEIGHT: 61 IN | BODY MASS INDEX: 26.62 KG/M2 | DIASTOLIC BLOOD PRESSURE: 77 MMHG

## 2024-10-04 DIAGNOSIS — K71.9 DRUG-INDUCED LIVER INJURY: ICD-10-CM

## 2024-10-04 DIAGNOSIS — K74.60 HEPATIC CIRRHOSIS, UNSPECIFIED HEPATIC CIRRHOSIS TYPE, UNSPECIFIED WHETHER ASCITES PRESENT: ICD-10-CM

## 2024-10-04 DIAGNOSIS — R74.8 ELEVATED LIVER ENZYMES: ICD-10-CM

## 2024-10-04 DIAGNOSIS — D69.3 CHRONIC ITP (IDIOPATHIC THROMBOCYTOPENIA): ICD-10-CM

## 2024-10-04 DIAGNOSIS — I85.00 ESOPHAGEAL VARICES WITHOUT BLEEDING, UNSPECIFIED ESOPHAGEAL VARICES TYPE: ICD-10-CM

## 2024-10-04 DIAGNOSIS — I81 PVT (PORTAL VEIN THROMBOSIS): ICD-10-CM

## 2024-10-04 DIAGNOSIS — Z79.899 HIGH RISK MEDICATION USE: ICD-10-CM

## 2024-10-04 DIAGNOSIS — Z98.890 HISTORY OF LIVER BIOPSY: ICD-10-CM

## 2024-10-04 DIAGNOSIS — D68.61 ANTIPHOSPHOLIPID ANTIBODY SYNDROME: ICD-10-CM

## 2024-10-04 PROCEDURE — 99214 OFFICE O/P EST MOD 30 MIN: CPT | Performed by: PHYSICIAN ASSISTANT

## 2024-10-04 RX ORDER — HYDROXYCHLOROQUINE SULFATE 200 MG/1
AS DIRECTED TABLET, FILM COATED ORAL
Status: ACTIVE | COMMUNITY

## 2024-10-04 RX ORDER — PANCRELIPASE 36000; 180000; 114000 [USP'U]/1; [USP'U]/1; [USP'U]/1
AS DIRECTED CAPSULE, DELAYED RELEASE PELLETS ORAL
Qty: 900 | Refills: 1 | Status: ACTIVE | COMMUNITY
Start: 2024-03-20 | End: 2024-10-09

## 2024-10-04 RX ORDER — ESCITALOPRAM OXALATE 5 MG/1
1 TABLET TABLET, FILM COATED ORAL ONCE A DAY
Status: ACTIVE | COMMUNITY

## 2024-10-04 RX ORDER — AZATHIOPRINE 50 MG/1
AS DIRECTED TABLET ORAL
Status: ACTIVE | COMMUNITY

## 2024-10-04 RX ORDER — CHOLESTYRAMINE POWDER FOR SUSPENSION 4 G/8.78G
TAKE 1 PACKET MIXED WITH WATER OR NON-CARBONATED DRINK BY MOUTH ONCE A DAY POWDER, FOR SUSPENSION ORAL
Qty: 90 PACK | Refills: 1 | Status: ACTIVE | COMMUNITY

## 2024-10-04 NOTE — HPI-TODAY'S VISIT:
This is a 32 year old female who was recently seen in the hospital by Dr. Shona Fuller who presetns for evaluation of elevated liver enzymes.    10/4/24 Dear Karen Ngo,  The recent MRI was sent to me. The liver without any fat or iron. They saw changes of chronic liver disease. They saw an area of enhancement in segment 7 measuring 3 x 2.3 cm and increased in size since September 2023 where was 2.6 x 1.9 cm and consistent with focal nodular hyperplasia which is benign and they recommend we check on this again in 3 to 6 months. Liver stiffness 3.0 kPa. This is consistent with stages 1-2 fibrosis. The gallbladder and biliary tree with a fluid-filled cystic structure connected to the cystic duct and the gallbladder fossa which could be a patulous cyst or a gallbladder remnant. This was seen prior. Spleen at 14 cm. The pancreas, adrenals, kidneys are normal. The noted moderate colonic stool burden. They noted a chronically thrombosed portal system with varices. Overall they saw changes of chronic liver disease but with only stage I-II fibrosis which is good to note. They noted that the area in segment 7 would need to be monitored with Eovist in 3 to 6 months and suspected this was focal nodular hyperplasia. We will review at the visit. Overall other than the segment 7 lesion this seems stable.  Flory Kumar PA-C  8/2024 EGD Grade II esophageal varices--no band ligation                        performed                        - Minimal portal hypertensive gastropathy.                        - No specimens collected.  9/19/24 labs sodium 136, potassium 4.3, creatinine 0.69, bilirubin 0.7, alkaline phosphatase 107, AST 36, ALT 20.  The complete blood count showed white blood cells were normal and hemoglobin 11.2, MCV 95 and platelets 101.  Seeing hematology still.   recap 4/11/24 telemed  4/3/24 MRI EXAM: MR ABDOMEN WITH AND WITHOUT CONTRAST  CLINICAL INDICATION:  - Exocrine pancreatic insufficiency. Chronic portal vein thrombosis. SLE. Elevated liver enzymes. Hepatic cirrhosis. Chronic ITP.  TECHNIQUE: Multiplanar multisequence images were obtained through the abdomen without and with IV administration of 14 mL of Clariscan.  COMPARISON: Comparison is made to an outside prior abdominal MRI from September 6, 2023.  FINDINGS:  Evaluation of the visualized lower chest demonstrates the heart is within normal limits in size. There is no significant sized pleural or pericardial effusion at the visualized lung bases. Please note the lungs are suboptimally evaluated with MRI.  The liver demonstrates cirrhotic liver morphology with enlargement of the caudate lobe and mild nodularity/lobulation of the liver contour. The left hepatic lobe is relatively small in size. There is no significant hepatic steatosis. There is a arterial enhancing lesion in the posterior segment of the right hepatic lobe at the margin of the inferior vena cava measuring 2.4 x 2.8 cm (series 20 image 25 which demonstrates homogeneous arterial enhancement. Mild postcontrast enhancement persists on portal venous and delayed phase images without washout or capsular enhancement. This lesion is slightly hyperintense with respect to the liver on T2-weighted images. There is no significant signal drop within the lesion on T1-weighted out of phase images and this lesion is isointense to slightly hyperintense with respect to the liver on T1-weighted noncontrast images. Evaluation for diffusion restriction is limited by significant motion. There is no significant diffusion restriction within the limitation. This lesion is stable in size and appearance compared to the prior MRI from September 6, 2023. There are also several small subcentimeter foci of arterial enhancement in the liver for example in the anterior segment of the right hepatic lobe measuring 5 mm (series 20 image 37) not clearly visualized on the remaining sequences most likely small foci of shunting/perfusion defects. The patient is status post cholecystectomy. Trace fluid in the gallbladder fossa may represent a prominent cystic duct or gallbladder remnant. There is no intra or extrahepatic biliary dilatation.  There is mild splenomegaly and the spleen measures 13.5 cm in AP dimension. There is a small splenule at the splenic hilum closely approximating the pancreatic tail. The spleen is otherwise unremarkable. The adrenal glands are unremarkable. There is mesenteric edema and trace ascites including trace peripancreatic fluid. There are a few punctate T2 hyperintense linear foci within the pancreas which are nonspecific but may represent prominent foci of sidebranch ectasia or may represent prominent peripancreatic collateral vessels projecting at the margin of the pancreas. T1 signal intensity of the pancreas is within normal limits. No soft tissue mass is identified within the pancreas. There is no dilatation of the main pancreatic duct.  The kidneys enhance symmetrically. There is no hydronephrosis.  The abdominal aorta is patent without aneurysmal dilatation. The celiac, SMA and inferior mesenteric arteries are patent. The superior mesenteric vein is patent. The splenic vein is not seen to be patent posterior to the pancreatic body but is grossly patent in the splenic hilum. There is no well-formed main portal vein entering the liver consistent with the patient's history of portal vein thrombosis and the portal vein is occluded at the portal confluence. There is evidence for cavernous transformation of the main portal vein with numerous collateral vessels in the abdomen including periportal, gastric, perigastric, perisplenic and esophageal and periesophageal varices.  Evaluation of the bowel demonstrates there is no evidence for mechanical intestinal obstruction. The bowel is not imaged in its entirety.  There is mesenteric edema. There is trace perisplenic fluid.  There is no abdominal lymphadenopathy by size criteria.  No suspicious osseous lesion is identified.   IMPRESSION: 1. Cirrhotic liver morphology. Arterial enhancing lesion in the posterior segment of the right hepatic lobe measuring 2.8 cm, stable in size and appearance compared to the outside prior MRI from September 2023. The primary differential is focal nodular hyperplasia/FNH-like lesion. Additional subcentimeter foci of arterial enhancement in the liver most likely small foci of shunting/perfusion defects. Continued attention on the 6-12 month follow-up examination is recommended. 2. Occlusion of the main portal vein with cavernous transformation, unchanged. Collateral vessels in the upper abdomen including at the gurjit hepatis, peripancreatic, perisplenic and esophageal, periesophageal varices. 3. Splenomegaly. Mesenteric edema. Trace abdominal ascites. 4. Mesenteric edema and trace perisplenic fluid which may be secondary to portal hypertension. Correlation with lipase values could be performed if there is concern for mild pancreatitis.  reviewed the above.  need update labs, she has orders.  seeing GI for pancreatic insuff.  [facility] prior and get more consistnet reads and noting a lot of movement changes   3/1/24 ov  The 1/3/24 labs were sent to me. The white blood cells 3.1, hemoglobin 10.9, MCV 87, platelets 75 and this is low. Please share with your other providers including the hematologist. Glucose 74, creatinine 0.69, sodium 138, potassium 4.1, bilirubin 0.6, alkaline phosphatase 130, AST 40, ALT 23. Goal for the AST and ALT is less than 25. Previously back in November the AST was 35 and ALT was 22 so slightly higher. = Flory Kumar PA-C saw labs in 2/1/24  and ast 52, alt 26 she had the EGD done and they small varices and that is due to the portal htn but they did not have any bands  she is not on BB and per dr. ford this type of portal htn does not realy respond to that  she started imuran back 4 weeks ago back on eliquis and plaquenil she is not on any other vitamins.  she was on imuran before and no issues.  discussed need to monitor while on the imuran.  check labs again today and in 6 weeks and telemed after.  historically more sensitive to different supplements and meds so need to monitor 12/7/23 telemed  11/2023 labs with na 139, cr 0.72, alp 107, ast 35, alt 22, tb 0.5seeing hematology and discussed the platelets and are monitoring had EGD and needs bands so they are repeating in jan 2024 seeing cardiology and having echo tomorrow and also was referred to neuropsych due to palpitations   9/2023 The final MRI report was sent to me. The lower thorax appeared normal. The liver without fat or iron. They did see morphologic changes of chronic liver disease with unchanged or stable cavernous transformation of the main portal vein. They did see varices. This is why it is important that you are doing the EGD soon. They did not see any suspicious lesions. They did see a shunt near the inferior vena cava but they did not think it was suspicious. They saw extensive varices encasing the common duct without significant upstream biliary ductal dilatation. The spleen measures up to 13.4 cm. This is slightly enlarged but stable. The lymph nodes appeared normal vessels appear normal. Pancreas adrenal glands and kidneys all normal. Overall they thought this is a stable MRI showing the changes of the chronic liver disease and varices with did not see suspicious lesions. If you recall you did have that biopsy that did not show extensive fibrosis or evidence of chronic liver disease.   we reviewed mri abck in sept disucssed need to stay on imaging and egds.     9/7/23 9/7/23 iron 46, iron binding capacity 393, percent saturation.  Ferritin level 470 complete blood count with white blood cells 3.2, red blood cells 3.5, hemoglobin 9, and 85, platelets 152. they did not do liver enzymes she had her MRI yesterda but not read yet She is doing rituxan to help with her platelets as they were dropping.  She has the EGD scheduled for next friday.    5/27/23 labsThe labs were sent to me.  The glucose is low at 40 and I would recommend monitoring for signs of hypoglycemia.  Creatinine 0.56, sodium 135, potassium 4.7, alkaline phosphatase 114, bilirubin 0.7, AST 36, ALT 23.  It seems the AST is stuck at 36.  The white blood cells low at 3.0, hemoglobin 8.7, MCV 80.2, platelets 145.  As discussed be sure to follow-up on the EGD.  Please let us know if there are any issues with the referral.  INR 1.1.  Also this is a reminder to schedule the MRI and let us know if there are issues with that as well.  Flory Kumar PA-C 5/26  The 5/2/23 labs The red blood cells low at 3.7, hemoglobin low at 9, mcv low at 78, platelets low at 131.Glucose 105, creatinine 0.62, sodium 137, potassium 3.9, bilirubin 0.6, albumin low at 3.4, alkaline phosphatase 102, ast 36, alt 30. INR 1.1. It appears the liver enzymes are slightly higher and sometimes the steriods can increase  no new meds, was on steriods in late april so sugars higher she may have had ibprofen around that time She will do labs today   4/21/23 Pt no showed appt last week. several attempts were made to contact and no answer.  She saw the hematologist. her platlets are low and going on steriods.  They are discussing rixutan  4/20/23 labs with alp 107, ast 37, 20. cr 0.63,  cbc with white blood cells 2.7, 3.54, hemoglobin 8.8, 39, mcv 81 happy to see the enzymes are better but concerned with platelets and they will be monitoring this check the immunoglobulins next week she is working on reducing the sugar in diet and red meat She will need updated imaging on the liver and clot  she is due for egd in june with dr. mckay   3/1/23 2/23/23 alk phos 392, ast 178, alt 212 igg 2438 bili 1.0 recap 2/13/23 labs with alp 578, ast 152, alt  117 2/9/23 652  .  2/7/23 alp 612, [id number], [id number] 2/6/23 alp 736, ast 162, alt 100.    Asked about diet and having more solid foods with grilled, baked foods and juicing. She is juicing a lot of fruits and vegetables with the benefiber. B12 folic, plaquenil, eliquis . She is drinkign a lot of beet juice and beets and mixings in the fruits and apples and grapes. Mixes in the carrots and leafy greens. she is not doing any protein powders. She is also doing sea strauss and she will stop this, was not taking at last visit. She started this and the juicing with beets after the last visit.   recap 2/1/23 Patient with past medical history including history of liver biopsy, chronic portal vein thrombosis, antiphospholipid syndrome, chronic idiopathic thrombocytopenia.  She is listed taking apixaban 5 mg twice daily, vitamin D, folic acid 1 mg, hydroxychloroquine alone 20 mg, pantoprazole 40 mg, presented to the hospital with right upper quadrant pain and elevated liver function test.  CT showing the chronic portal vein occlusion with cavernous formation and changes of chronic liver disease.  While in the hospital acute hepatitis panel negative and they suspected the elevation in her labs is due to the various supplements she was taking including dandelion root, tumeric, ashwaganda and this was stopped. She had a cholecystectomy while in the hospital.   1/28/23 labs cr 0.59, sodium 138, potassium 3.6, alp 359, alt 69, ast88, bilirubin 0.5. wbc 3.4, hemoglobin 9.0, mcv 84,  Aug 2022 labs alp 213, ast 53, alt 50 MRI 7/2022 liver w/changes of chronic liver disase and stable chronic thrombosis of main and right pv with cavernous transformation. thrombosis of splenic vein. Esophageal, perisplenic, and perigastric varices.  Last egd in June and banding was done  EGD done in june and 1 grade 1 varice was banded and will repeat in 1 year. Denies any issues with swelling.  Saw the surgeon yesterday, drain removed and surgical sites healing well. Still some pain and fatigue but slowly getting better. She is still off of the supplements. She had labs done yesterday and 2/3/23 alp 640, ast 151, alt 88 cr 0.74 and asked about what changed and found to be taking motrin and suspect this is the cause and she has not changed anything else. DIscussed NSAIDS and cautioned on this Discussed we will montior the labs and cautioned on other supplements.

## 2024-10-07 ENCOUNTER — LAB OUTSIDE AN ENCOUNTER (OUTPATIENT)
Dept: URBAN - METROPOLITAN AREA TELEHEALTH 2 | Facility: TELEHEALTH | Age: 34
End: 2024-10-07

## 2024-11-14 ENCOUNTER — OFFICE VISIT (OUTPATIENT)
Dept: URBAN - METROPOLITAN AREA CLINIC 92 | Facility: CLINIC | Age: 34
End: 2024-11-14
Payer: COMMERCIAL

## 2024-11-14 VITALS
SYSTOLIC BLOOD PRESSURE: 93 MMHG | HEART RATE: 84 BPM | DIASTOLIC BLOOD PRESSURE: 66 MMHG | HEIGHT: 61 IN | WEIGHT: 142 LBS | TEMPERATURE: 97.2 F | BODY MASS INDEX: 26.81 KG/M2

## 2024-11-14 DIAGNOSIS — K86.81 EXOCRINE PANCREATIC INSUFFICIENCY: ICD-10-CM

## 2024-11-14 DIAGNOSIS — K74.60 HEPATIC CIRRHOSIS, UNSPECIFIED HEPATIC CIRRHOSIS TYPE, UNSPECIFIED WHETHER ASCITES PRESENT: ICD-10-CM

## 2024-11-14 DIAGNOSIS — R14.0 ABDOMINAL BLOATING: ICD-10-CM

## 2024-11-14 DIAGNOSIS — I85.00 VARICES, ESOPHAGEAL: ICD-10-CM

## 2024-11-14 DIAGNOSIS — Z90.49 HISTORY OF CHOLECYSTECTOMY: ICD-10-CM

## 2024-11-14 DIAGNOSIS — M32.9 LUPUS: ICD-10-CM

## 2024-11-14 PROCEDURE — 99213 OFFICE O/P EST LOW 20 MIN: CPT

## 2024-11-14 RX ORDER — HYDROXYCHLOROQUINE SULFATE 200 MG/1
AS DIRECTED TABLET, FILM COATED ORAL
Status: ACTIVE | COMMUNITY

## 2024-11-14 RX ORDER — AZATHIOPRINE 50 MG/1
AS DIRECTED TABLET ORAL
Status: ACTIVE | COMMUNITY

## 2024-11-14 NOTE — HPI-TODAY'S VISIT:
34-year-old female presents today for EPI f/u. She has hx of PVT c/b cirrhosis, on Eliquis, SLE, ITP, recurrent PEs, CCY jan 2023.  Patient states that she has always had issues with her digestive system but this has been exacerbated since having a CCY in January 2023.  She was having more frequent bowel movements that are loose and then will have intermittent constipation. She also had a bowel movement after every meal. She denies any hematochezia, melena or significant weight loss. She also c/o periumbilical stomach cramping after she ate certain types of foods. She also experiences nausea along with this. After last visit celiac labs were normal. Pancreatic elastase was <50 so Creon was sent.  She is having more normal BM daily with Creon. Bloating and cramping is also getting better. She was also given Questran prior to EPI dx and this helped her BM as well. Denies any vomiting, GERD symptoms, dysphagia, odynophagia, rare NSAID use. She does have lupus which was diagnosed as a child.  She is currently on Plaquenil daily.  She does have history of chronic anemia Has had esophageal varices s/p banding at [place], and she follows over there to get EGDs annually. EGD 1/30/24: small varcies, portal htn gastropathy

## 2025-01-22 NOTE — PHYSICAL EXAM NECK/THYROID:
Date of procedure: 1/9/2025  Procedure:   1. Robotic assisted laparoscopic repair of an incarcerated incisional hernia with mesh, fascial defect 15 x 10 cm  2. Robotic assisted laparoscopic development of bilateral retrorectus spaces / separation of components  3. Robotic assisted laparoscopic bilateral transversus abdominis release    Subjective:   Dany Siegel is a 62-year-old male with a significant past medical history of hypertension and arthritis that presents to clinic today for evaluation regarding a complex incisional hernia. He underwent surgery in 2021 for a ruptured small bowel, during which a segment of his small intestine was removed. Following this surgery, he developed a hernia in the same region and he now presents to clinic today for follow up after undergoing the above listed procedure. He is doing well. He has no pain.  No fevers or chills.  No nausea vomiting.  Tolerating a diet with adequate bowel function.  No other complaints.      Objective:   /77 (BP Location: LUE - Left upper extremity, Patient Position: Sitting, Cuff Size: Regular)   Pulse 96   Temp 98.1 °F (36.7 °C) (Temporal)   Ht 6' (1.829 m)   Wt 94.8 kg (209 lb)   BMI 28.35 kg/m²   BSA 2.17 m²      Physical Exam  Vitals reviewed.   Abdominal:      Comments: Incisions healing nicely, healing ridge noted.  No signs of infection or hernia recurrence   Neurological:      Mental Status: He is alert.           Assessment and Plan:   1. Incarcerated incisional hernia        -He is recovering well from surgery.  -He will continue to refrain from lifting over 15-20 pounds for the next 2 weeks.  After that it can be increased to 30 pounds  -Continue to wear the abdominal binder as instructed  -He will return to clinic in 1 month    Kuldeep Pritchard MD       normal appearance , no deformities

## 2025-02-03 ENCOUNTER — LAB OUTSIDE AN ENCOUNTER (OUTPATIENT)
Dept: URBAN - METROPOLITAN AREA CLINIC 86 | Facility: CLINIC | Age: 35
End: 2025-02-03

## 2025-02-13 ENCOUNTER — OFFICE VISIT (OUTPATIENT)
Dept: URBAN - METROPOLITAN AREA CLINIC 92 | Facility: CLINIC | Age: 35
End: 2025-02-13
Payer: COMMERCIAL

## 2025-02-13 VITALS
SYSTOLIC BLOOD PRESSURE: 106 MMHG | BODY MASS INDEX: 27 KG/M2 | DIASTOLIC BLOOD PRESSURE: 73 MMHG | HEIGHT: 61 IN | WEIGHT: 143 LBS | HEART RATE: 85 BPM | TEMPERATURE: 97.4 F

## 2025-02-13 DIAGNOSIS — K74.60 HEPATIC CIRRHOSIS, UNSPECIFIED HEPATIC CIRRHOSIS TYPE, UNSPECIFIED WHETHER ASCITES PRESENT: ICD-10-CM

## 2025-02-13 DIAGNOSIS — M32.9 LUPUS: ICD-10-CM

## 2025-02-13 DIAGNOSIS — K86.81 EXOCRINE PANCREATIC INSUFFICIENCY: ICD-10-CM

## 2025-02-13 DIAGNOSIS — Z90.49 HISTORY OF CHOLECYSTECTOMY: ICD-10-CM

## 2025-02-13 DIAGNOSIS — I85.00 VARICES, ESOPHAGEAL: ICD-10-CM

## 2025-02-13 DIAGNOSIS — R14.0 ABDOMINAL BLOATING: ICD-10-CM

## 2025-02-13 PROCEDURE — 99213 OFFICE O/P EST LOW 20 MIN: CPT

## 2025-02-13 RX ORDER — HYDROXYCHLOROQUINE SULFATE 200 MG/1
AS DIRECTED TABLET, FILM COATED ORAL
Status: ACTIVE | COMMUNITY

## 2025-02-13 RX ORDER — PANCRELIPASE LIPASE, PANCRELIPASE PROTEASE, PANCRELIPASE AMYLASE 40000; 126000; 168000 [USP'U]/1; [USP'U]/1; [USP'U]/1
2 CAPSULES WITH MEAL 1 WITH SNACK CAPSULE, DELAYED RELEASE ORAL ONCE A DAY
Qty: 800 | Refills: 3
Start: 2024-04-15 | End: 2026-02-08

## 2025-02-13 RX ORDER — AZATHIOPRINE 50 MG/1
AS DIRECTED TABLET ORAL
Status: ACTIVE | COMMUNITY

## 2025-02-13 NOTE — HPI-TODAY'S VISIT:
34-year-old female presents today for EPI f/u. She has hx of PVT c/b cirrhosis, on Eliquis, SLE, ITP, recurrent PEs, CCY jan 2023.  Patient states that she has always had issues with her digestive system but this has been exacerbated since having a CCY in January 2023.  She was having more frequent bowel movements that are loose and then will have intermittent constipation. She also had a bowel movement after every meal. She denies any hematochezia, melena or significant weight loss. She also c/o periumbilical stomach cramping after she ate certain types of foods. She also experiences nausea along with this. After last visit celiac labs were normal. Pancreatic elastase was <50 so Creon was sent. This was on back order so was switched to zenpep and she prefers this. She is having more normal BM daily. Bloating and cramping is also getting better. She started IBgard for this which has helped. Denies any vomiting, GERD symptoms, dysphagia, odynophagia, rare NSAID use. She does have lupus which was diagnosed as a child.  She is currently on Plaquenil daily.  She does have history of chronic anemia Has had esophageal varices s/p banding at [place], and she follows over there to get EGDs annually. EGD 8/30/24: Grade II esophageal varices--no band ligation performed. Minimal portal hypertensive gastropathy.

## 2025-03-03 ENCOUNTER — LAB OUTSIDE AN ENCOUNTER (OUTPATIENT)
Dept: URBAN - METROPOLITAN AREA CLINIC 86 | Facility: CLINIC | Age: 35
End: 2025-03-03

## 2025-03-14 ENCOUNTER — OFFICE VISIT (OUTPATIENT)
Dept: URBAN - METROPOLITAN AREA CLINIC 86 | Facility: CLINIC | Age: 35
End: 2025-03-14
Payer: COMMERCIAL

## 2025-03-14 VITALS
BODY MASS INDEX: 26.81 KG/M2 | WEIGHT: 142 LBS | SYSTOLIC BLOOD PRESSURE: 101 MMHG | TEMPERATURE: 96.6 F | DIASTOLIC BLOOD PRESSURE: 78 MMHG | HEART RATE: 88 BPM | HEIGHT: 61 IN

## 2025-03-14 DIAGNOSIS — R74.8 ELEVATED LIVER ENZYMES: ICD-10-CM

## 2025-03-14 DIAGNOSIS — I81 PVT (PORTAL VEIN THROMBOSIS): ICD-10-CM

## 2025-03-14 DIAGNOSIS — Z79.899 HIGH RISK MEDICATION USE: ICD-10-CM

## 2025-03-14 DIAGNOSIS — K71.9 DRUG-INDUCED LIVER INJURY: ICD-10-CM

## 2025-03-14 DIAGNOSIS — D68.61 ANTIPHOSPHOLIPID ANTIBODY SYNDROME: ICD-10-CM

## 2025-03-14 DIAGNOSIS — D69.3 CHRONIC ITP (IDIOPATHIC THROMBOCYTOPENIA): ICD-10-CM

## 2025-03-14 DIAGNOSIS — I85.00 ESOPHAGEAL VARICES WITHOUT BLEEDING, UNSPECIFIED ESOPHAGEAL VARICES TYPE: ICD-10-CM

## 2025-03-14 DIAGNOSIS — K74.60 HEPATIC CIRRHOSIS, UNSPECIFIED HEPATIC CIRRHOSIS TYPE, UNSPECIFIED WHETHER ASCITES PRESENT: ICD-10-CM

## 2025-03-14 DIAGNOSIS — Z98.890 HISTORY OF LIVER BIOPSY: ICD-10-CM

## 2025-03-14 PROCEDURE — 99214 OFFICE O/P EST MOD 30 MIN: CPT | Performed by: PHYSICIAN ASSISTANT

## 2025-03-14 RX ORDER — PANCRELIPASE LIPASE, PANCRELIPASE PROTEASE, PANCRELIPASE AMYLASE 40000; 126000; 168000 [USP'U]/1; [USP'U]/1; [USP'U]/1
2 CAPSULES WITH MEAL 1 WITH SNACK CAPSULE, DELAYED RELEASE ORAL ONCE A DAY
Qty: 800 | Refills: 3 | Status: ACTIVE | COMMUNITY
Start: 2024-04-15 | End: 2026-02-08

## 2025-03-14 RX ORDER — AZATHIOPRINE 50 MG/1
AS DIRECTED TABLET ORAL
Status: ACTIVE | COMMUNITY

## 2025-03-14 RX ORDER — HYDROXYCHLOROQUINE SULFATE 200 MG/1
AS DIRECTED TABLET, FILM COATED ORAL
Status: ACTIVE | COMMUNITY

## 2025-03-14 NOTE — HPI-TODAY'S VISIT:
This is a 32 year old female who was recently seen in the hospital by Dr. Shona Fuller who presetns for evaluation of elevated liver enzymes.   3/14/25 she has tsh now up. wbc low and platletes low. 1.9 and platelts 105. ast 14 and alt 36 alp 89  she is pending seeing endenisse needs to schedule mri w/ eovist 10/4/24 Dear Karen Ngo,  The recent MRI was sent to me. The liver without any fat or iron. They saw changes of chronic liver disease. They saw an area of enhancement in segment 7 measuring 3 x 2.3 cm and increased in size since September 2023 where was 2.6 x 1.9 cm and consistent with focal nodular hyperplasia which is benign and they recommend we check on this again in 3 to 6 months. Liver stiffness 3.0 kPa. This is consistent with stages 1-2 fibrosis. The gallbladder and biliary tree with a fluid-filled cystic structure connected to the cystic duct and the gallbladder fossa which could be a patulous cyst or a gallbladder remnant. This was seen prior. Spleen at 14 cm. The pancreas, adrenals, kidneys are normal. The noted moderate colonic stool burden. They noted a chronically thrombosed portal system with varices. Overall they saw changes of chronic liver disease but with only stage I-II fibrosis which is good to note. They noted that the area in segment 7 would need to be monitored with Eovist in 3 to 6 months and suspected this was focal nodular hyperplasia. We will review at the visit. Overall other than the segment 7 lesion this seems stable.  Flory Kumar PA-C  8/2024 EGD Grade II esophageal varices--no band ligation                        performed                        - Minimal portal hypertensive gastropathy.                        - No specimens collected.  9/19/24 labs sodium 136, potassium 4.3, creatinine 0.69, bilirubin 0.7, alkaline phosphatase 107, AST 36, ALT 20.  The complete blood count showed white blood cells were normal and hemoglobin 11.2, MCV 95 and platelets 101.  Seeing hematology still.   recap 4/11/24 telemed  4/3/24 MRI EXAM: MR ABDOMEN WITH AND WITHOUT CONTRAST  CLINICAL INDICATION:  - Exocrine pancreatic insufficiency. Chronic portal vein thrombosis. SLE. Elevated liver enzymes. Hepatic cirrhosis. Chronic ITP.  TECHNIQUE: Multiplanar multisequence images were obtained through the abdomen without and with IV administration of 14 mL of Clariscan.  COMPARISON: Comparison is made to an outside prior abdominal MRI from September 6, 2023.  FINDINGS:  Evaluation of the visualized lower chest demonstrates the heart is within normal limits in size. There is no significant sized pleural or pericardial effusion at the visualized lung bases. Please note the lungs are suboptimally evaluated with MRI.  The liver demonstrates cirrhotic liver morphology with enlargement of the caudate lobe and mild nodularity/lobulation of the liver contour. The left hepatic lobe is relatively small in size. There is no significant hepatic steatosis. There is a arterial enhancing lesion in the posterior segment of the right hepatic lobe at the margin of the inferior vena cava measuring 2.4 x 2.8 cm (series 20 image 25 which demonstrates homogeneous arterial enhancement. Mild postcontrast enhancement persists on portal venous and delayed phase images without washout or capsular enhancement. This lesion is slightly hyperintense with respect to the liver on T2-weighted images. There is no significant signal drop within the lesion on T1-weighted out of phase images and this lesion is isointense to slightly hyperintense with respect to the liver on T1-weighted noncontrast images. Evaluation for diffusion restriction is limited by significant motion. There is no significant diffusion restriction within the limitation. This lesion is stable in size and appearance compared to the prior MRI from September 6, 2023. There are also several small subcentimeter foci of arterial enhancement in the liver for example in the anterior segment of the right hepatic lobe measuring 5 mm (series 20 image 37) not clearly visualized on the remaining sequences most likely small foci of shunting/perfusion defects. The patient is status post cholecystectomy. Trace fluid in the gallbladder fossa may represent a prominent cystic duct or gallbladder remnant. There is no intra or extrahepatic biliary dilatation.  There is mild splenomegaly and the spleen measures 13.5 cm in AP dimension. There is a small splenule at the splenic hilum closely approximating the pancreatic tail. The spleen is otherwise unremarkable. The adrenal glands are unremarkable. There is mesenteric edema and trace ascites including trace peripancreatic fluid. There are a few punctate T2 hyperintense linear foci within the pancreas which are nonspecific but may represent prominent foci of sidebranch ectasia or may represent prominent peripancreatic collateral vessels projecting at the margin of the pancreas. T1 signal intensity of the pancreas is within normal limits. No soft tissue mass is identified within the pancreas. There is no dilatation of the main pancreatic duct.  The kidneys enhance symmetrically. There is no hydronephrosis.  The abdominal aorta is patent without aneurysmal dilatation. The celiac, SMA and inferior mesenteric arteries are patent. The superior mesenteric vein is patent. The splenic vein is not seen to be patent posterior to the pancreatic body but is grossly patent in the splenic hilum. There is no well-formed main portal vein entering the liver consistent with the patient's history of portal vein thrombosis and the portal vein is occluded at the portal confluence. There is evidence for cavernous transformation of the main portal vein with numerous collateral vessels in the abdomen including periportal, gastric, perigastric, perisplenic and esophageal and periesophageal varices.  Evaluation of the bowel demonstrates there is no evidence for mechanical intestinal obstruction. The bowel is not imaged in its entirety.  There is mesenteric edema. There is trace perisplenic fluid.  There is no abdominal lymphadenopathy by size criteria.  No suspicious osseous lesion is identified.   IMPRESSION: 1. Cirrhotic liver morphology. Arterial enhancing lesion in the posterior segment of the right hepatic lobe measuring 2.8 cm, stable in size and appearance compared to the outside prior MRI from September 2023. The primary differential is focal nodular hyperplasia/FNH-like lesion. Additional subcentimeter foci of arterial enhancement in the liver most likely small foci of shunting/perfusion defects. Continued attention on the 6-12 month follow-up examination is recommended. 2. Occlusion of the main portal vein with cavernous transformation, unchanged. Collateral vessels in the upper abdomen including at the gurjit hepatis, peripancreatic, perisplenic and esophageal, periesophageal varices. 3. Splenomegaly. Mesenteric edema. Trace abdominal ascites. 4. Mesenteric edema and trace perisplenic fluid which may be secondary to portal hypertension. Correlation with lipase values could be performed if there is concern for mild pancreatitis.  reviewed the above.  need update labs, she has orders.  seeing GI for pancreatic insuff.  recommend MRI at [facility] next time as se has done there prior and get more consistnet reads and noting a lot of movement changes   3/1/24 ov  The 1/3/24 labs were sent to me. The white blood cells 3.1, hemoglobin 10.9, MCV 87, platelets 75 and this is low. Please share with your other providers including the hematologist. Glucose 74, creatinine 0.69, sodium 138, potassium 4.1, bilirubin 0.6, alkaline phosphatase 130, AST 40, ALT 23. Goal for the AST and ALT is less than 25. Previously back in November the AST was 35 and ALT was 22 so slightly higher. = Flory Kumar PA-C saw labs in 2/1/24  and ast 52, alt 26 she had the EGD done and they small varices and that is due to the portal htn but they did not have any bands  she is not on BB and per dr. ford this type of portal htn does not realy respond to that  she started imuran back 4 weeks ago back on eliquis and plaquenil she is not on any other vitamins.  she was on imuran before and no issues.  discussed need to monitor while on the imuran.  check labs again today and in 6 weeks and telemed after.  historically more sensitive to different supplements and meds so need to monitor 12/7/23 telemed  11/2023 labs with na 139, cr 0.72, alp 107, ast 35, alt 22, tb 0.5seeing hematology and discussed the platelets and are monitoring had EGD and needs bands so they are repeating in jan 2024 seeing cardiology and having echo tomorrow and also was referred to neuropsych due to palpitations   9/2023 The final MRI report was sent to me. The lower thorax appeared normal. The liver without fat or iron. They did see morphologic changes of chronic liver disease with unchanged or stable cavernous transformation of the main portal vein. They did see varices. This is why it is important that you are doing the EGD soon. They did not see any suspicious lesions. They did see a shunt near the inferior vena cava but they did not think it was suspicious. They saw extensive varices encasing the common duct without significant upstream biliary ductal dilatation. The spleen measures up to 13.4 cm. This is slightly enlarged but stable. The lymph nodes appeared normal vessels appear normal. Pancreas adrenal glands and kidneys all normal. Overall they thought this is a stable MRI showing the changes of the chronic liver disease and varices with did not see suspicious lesions. If you recall you did have that biopsy that did not show extensive fibrosis or evidence of chronic liver disease.   we reviewed mri abck in sept disucssed need to stay on imaging and egds.     9/7/23 9/7/23 iron 46, iron binding capacity 393, percent saturation.  Ferritin level 470 complete blood count with white blood cells 3.2, red blood cells 3.5, hemoglobin 9, and 85, platelets 152. they did not do liver enzymes she had her MRI yesterda but not read yet She is doing rituxan to help with her platelets as they were dropping.  She has the EGD scheduled for next friday.    5/27/23 labsThe labs were sent to me.  The glucose is low at 40 and I would recommend monitoring for signs of hypoglycemia.  Creatinine 0.56, sodium 135, potassium 4.7, alkaline phosphatase 114, bilirubin 0.7, AST 36, ALT 23.  It seems the AST is stuck at 36.  The white blood cells low at 3.0, hemoglobin 8.7, MCV 80.2, platelets 145.  As discussed be sure to follow-up on the EGD.  Please let us know if there are any issues with the referral.  INR 1.1.  Also this is a reminder to schedule the MRI and let us know if there are issues with that as well.  Flory Kumar PA-C 5/26  The 5/2/23 labs The red blood cells low at 3.7, hemoglobin low at 9, mcv low at 78, platelets low at 131.Glucose 105, creatinine 0.62, sodium 137, potassium 3.9, bilirubin 0.6, albumin low at 3.4, alkaline phosphatase 102, ast 36, alt 30. INR 1.1. It appears the liver enzymes are slightly higher and sometimes the steriods can increase  no new meds, was on steriods in late april so sugars higher she may have had ibprofen around that time She will do labs today   4/21/23 Pt no showed appt last week. several attempts were made to contact and no answer.  She saw the hematologist. her platlets are low and going on steriods.  They are discussing rixutan  4/20/23 labs with alp 107, ast 37, 20. cr 0.63,  cbc with white blood cells 2.7, 3.54, hemoglobin 8.8, 39, mcv 81 happy to see the enzymes are better but concerned with platelets and they will be monitoring this check the immunoglobulins next week she is working on reducing the sugar in diet and red meat She will need updated imaging on the liver and clot  she is due for egd in june with dr. mckay   3/1/23 2/23/23 alk phos 392, ast 178, alt 212 igg [id number] bili 1.0 recap 2/13/23 labs with alp 578, ast 152, alt  117 2/9/23 652  .  2/7/23 alp 612, [id number], [id number] 2/6/23 alp 736, ast 162, alt 100.    Asked about diet and having more solid foods with grilled, baked foods and juicing. She is juicing a lot of fruits and vegetables with the benefiber. B12 folic, plaquenil, eliquis . She is drinkign a lot of beet juice and beets and mixings in the fruits and apples and grapes. Mixes in the carrots and leafy greens. she is not doing any protein powders. She is also doing sea strauss and she will stop this, was not taking at last visit. She started this and the juicing with beets after the last visit.   recap 2/1/23 Patient with past medical history including history of liver biopsy, chronic portal vein thrombosis, antiphospholipid syndrome, chronic idiopathic thrombocytopenia.  She is listed taking apixaban 5 mg twice daily, vitamin D, folic acid 1 mg, hydroxychloroquine alone 20 mg, pantoprazole 40 mg, presented to the hospital with right upper quadrant pain and elevated liver function test.  CT showing the chronic portal vein occlusion with cavernous formation and changes of chronic liver disease.  While in the hospital acute hepatitis panel negative and they suspected the elevation in her labs is due to the various supplements she was taking including dandelion root, tumeric, ashwaganda and this was stopped. She had a cholecystectomy while in the hospital.   1/28/23 labs cr 0.59, sodium 138, potassium 3.6, alp 359, alt 69, ast88, bilirubin 0.5. wbc 3.4, hemoglobin 9.0, mcv 84,  Aug 2022 labs alp 213, ast 53, alt 50 MRI 7/2022 liver w/changes of chronic liver disase and stable chronic thrombosis of main and right pv with cavernous transformation. thrombosis of splenic vein. Esophageal, perisplenic, and perigastric varices.  Last egd in June and banding was done  EGD done in june and 1 grade 1 varice was banded and will repeat in 1 year. Denies any issues with swelling.  Saw the surgeon yesterday, drain removed and surgical sites healing well. Still some pain and fatigue but slowly getting better. She is still off of the supplements. She had labs done yesterday and 2/3/23 alp 640, ast 151, alt 88 cr 0.74 and asked about what changed and found to be taking motrin and suspect this is the cause and she has not changed anything else. DIscussed NSAIDS and cautioned on this Discussed we will montior the labs and cautioned on other supplements.

## 2025-04-29 ENCOUNTER — TELEPHONE ENCOUNTER (OUTPATIENT)
Dept: URBAN - METROPOLITAN AREA CLINIC 86 | Facility: CLINIC | Age: 35
End: 2025-04-29

## 2025-04-29 ENCOUNTER — LAB OUTSIDE AN ENCOUNTER (OUTPATIENT)
Dept: URBAN - METROPOLITAN AREA CLINIC 86 | Facility: CLINIC | Age: 35
End: 2025-04-29

## 2025-04-29 NOTE — HPI-TODAY'S VISIT:
Karen Ngo,  The recent MRI that you did was sent to us.  Overall it seems fairly stable.  They noted some iron deposition.  They saw changes of chronic liver disease and they noted that the arterially enhancing lesion in segment 7 still was not clear.  It was about 2.6 x 3.0 cm and prior 2.3 x 3.0 cm.  They recommend we repeat this with Eovist contrast and I can go ahead and put an order in for this.  I ordered this with eovist the last time send curious if they may have used someone else's order in the system. They did not think this was suspicous just were not clear on the diagnosis.  They noted that there was a fluid-filled cystic structure connected to your cystic duct that was stable.  Splenomegaly at 13.4 cm.  Previously 14.  The kidneys appeared normal.  They noted chronically thrombosed portal system with extensive varices.  This is stable. Flory Kumar PA-C

## 2025-07-07 ENCOUNTER — WEB ENCOUNTER (OUTPATIENT)
Dept: URBAN - METROPOLITAN AREA CLINIC 92 | Facility: CLINIC | Age: 35
End: 2025-07-07

## 2025-07-11 ENCOUNTER — OFFICE VISIT (OUTPATIENT)
Dept: URBAN - METROPOLITAN AREA CLINIC 92 | Facility: CLINIC | Age: 35
End: 2025-07-11

## 2025-07-11 RX ORDER — AZATHIOPRINE 50 MG/1
AS DIRECTED TABLET ORAL
Status: ACTIVE | COMMUNITY

## 2025-07-11 RX ORDER — HYDROXYCHLOROQUINE SULFATE 200 MG/1
AS DIRECTED TABLET, FILM COATED ORAL
Status: ACTIVE | COMMUNITY

## 2025-07-11 RX ORDER — PANCRELIPASE LIPASE, PANCRELIPASE PROTEASE, PANCRELIPASE AMYLASE 40000; 126000; 168000 [USP'U]/1; [USP'U]/1; [USP'U]/1
2 CAPSULES WITH MEAL 1 WITH SNACK CAPSULE, DELAYED RELEASE ORAL ONCE A DAY
Qty: 800 | Refills: 3 | Status: ACTIVE | COMMUNITY
Start: 2024-04-15 | End: 2026-02-08

## 2025-07-15 ENCOUNTER — WEB ENCOUNTER (OUTPATIENT)
Dept: URBAN - METROPOLITAN AREA CLINIC 92 | Facility: CLINIC | Age: 35
End: 2025-07-15

## 2025-07-15 RX ORDER — PANCRELIPASE LIPASE, PANCRELIPASE PROTEASE, PANCRELIPASE AMYLASE 40000; 126000; 168000 [USP'U]/1; [USP'U]/1; [USP'U]/1
2 CAPSULES WITH MEAL 1 WITH SNACK CAPSULE, DELAYED RELEASE ORAL ONCE A DAY
Qty: 800 | Refills: 3
Start: 2024-04-15

## 2025-07-17 ENCOUNTER — TELEPHONE ENCOUNTER (OUTPATIENT)
Dept: URBAN - METROPOLITAN AREA CLINIC 92 | Facility: CLINIC | Age: 35
End: 2025-07-17